# Patient Record
Sex: FEMALE | Race: WHITE | NOT HISPANIC OR LATINO | URBAN - METROPOLITAN AREA
[De-identification: names, ages, dates, MRNs, and addresses within clinical notes are randomized per-mention and may not be internally consistent; named-entity substitution may affect disease eponyms.]

---

## 2020-06-12 ENCOUNTER — OFFICE VISIT (OUTPATIENT)
Dept: URBAN - METROPOLITAN AREA CLINIC 97 | Facility: CLINIC | Age: 25
End: 2020-06-12
Payer: COMMERCIAL

## 2020-06-12 VITALS
HEIGHT: 65 IN | BODY MASS INDEX: 20.66 KG/M2 | WEIGHT: 124 LBS | DIASTOLIC BLOOD PRESSURE: 69 MMHG | RESPIRATION RATE: 16 BRPM | TEMPERATURE: 96.1 F | SYSTOLIC BLOOD PRESSURE: 109 MMHG

## 2020-06-12 DIAGNOSIS — K51.80 CHRONIC PANCOLONIC ULCERATIVE COLITIS: ICD-10-CM

## 2020-06-12 PROCEDURE — 96375 TX/PRO/DX INJ NEW DRUG ADDON: CPT | Performed by: INTERNAL MEDICINE

## 2020-06-12 PROCEDURE — 96413 CHEMO IV INFUSION 1 HR: CPT | Performed by: INTERNAL MEDICINE

## 2020-06-12 PROCEDURE — 96415 CHEMO IV INFUSION ADDL HR: CPT | Performed by: INTERNAL MEDICINE

## 2020-06-12 RX ORDER — INFLIXIMAB 100 MG/10ML
INFUSE 5 MG/KG OVER NO LESS THAN 2 HOUR(S) BY INTRAVENOUS ROUTE INJECTION, POWDER, LYOPHILIZED, FOR SOLUTION INTRAVENOUS
Qty: 1 | Refills: 0 | Status: ACTIVE | COMMUNITY
Start: 1900-01-01 | End: 1900-01-01

## 2020-06-12 RX ORDER — SOY PROTEIN
POWDER (GRAM) ORAL
Qty: 0 | Refills: 0 | Status: ACTIVE | COMMUNITY
Start: 1900-01-01 | End: 1900-01-01

## 2020-06-12 RX ORDER — SACCHAROMYCES BOULARDII 250 MG
CAPSULE ORAL
Qty: 0 | Refills: 0 | Status: ACTIVE | COMMUNITY
Start: 1900-01-01 | End: 1900-01-01

## 2020-06-12 RX ORDER — MESALAMINE 1000 MG/1
INSERT 1 SUPPOSITORY BY RECTAL ROUTE DAILY FOR 30 DAYS SUPPOSITORY RECTAL 1
Qty: 30 | Refills: 2 | Status: ACTIVE | COMMUNITY
Start: 2020-04-08 | End: 2020-07-07

## 2020-07-28 ENCOUNTER — TELEPHONE ENCOUNTER (OUTPATIENT)
Dept: URBAN - METROPOLITAN AREA CLINIC 92 | Facility: CLINIC | Age: 25
End: 2020-07-28

## 2020-07-30 ENCOUNTER — TELEPHONE ENCOUNTER (OUTPATIENT)
Dept: URBAN - METROPOLITAN AREA CLINIC 78 | Facility: CLINIC | Age: 25
End: 2020-07-30

## 2020-07-31 ENCOUNTER — OFFICE VISIT (OUTPATIENT)
Dept: URBAN - METROPOLITAN AREA SURGERY CENTER 15 | Facility: SURGERY CENTER | Age: 25
End: 2020-07-31

## 2020-07-31 ENCOUNTER — TELEPHONE ENCOUNTER (OUTPATIENT)
Dept: URBAN - METROPOLITAN AREA CLINIC 78 | Facility: CLINIC | Age: 25
End: 2020-07-31

## 2020-08-04 ENCOUNTER — OFFICE VISIT (OUTPATIENT)
Dept: URBAN - METROPOLITAN AREA CLINIC 77 | Facility: CLINIC | Age: 25
End: 2020-08-04

## 2020-08-07 ENCOUNTER — OFFICE VISIT (OUTPATIENT)
Dept: URBAN - METROPOLITAN AREA CLINIC 97 | Facility: CLINIC | Age: 25
End: 2020-08-07

## 2020-08-07 RX ORDER — SACCHAROMYCES BOULARDII 250 MG
CAPSULE ORAL
Qty: 0 | Refills: 0 | Status: ACTIVE | COMMUNITY
Start: 1900-01-01 | End: 1900-01-01

## 2020-08-07 RX ORDER — SOY PROTEIN
POWDER (GRAM) ORAL
Qty: 0 | Refills: 0 | Status: ACTIVE | COMMUNITY
Start: 1900-01-01 | End: 1900-01-01

## 2020-08-07 RX ORDER — INFLIXIMAB 100 MG/10ML
INFUSE 5 MG/KG OVER NO LESS THAN 2 HOUR(S) BY INTRAVENOUS ROUTE INJECTION, POWDER, LYOPHILIZED, FOR SOLUTION INTRAVENOUS
Qty: 1 | Refills: 0 | Status: ACTIVE | COMMUNITY
Start: 1900-01-01 | End: 1900-01-01

## 2020-09-18 ENCOUNTER — TELEPHONE ENCOUNTER (OUTPATIENT)
Dept: URBAN - METROPOLITAN AREA CLINIC 6 | Facility: CLINIC | Age: 25
End: 2020-09-18

## 2020-09-18 RX ORDER — INFLIXIMAB 100 MG/10ML
INFUSE 5 MG/KG OVER NO LESS THAN 2 HOUR(S) BY INTRAVENOUS ROUTE INJECTION, POWDER, LYOPHILIZED, FOR SOLUTION INTRAVENOUS
Qty: 10 | Refills: 3
Start: 1900-01-01 | End: 1900-12-27

## 2020-09-21 ENCOUNTER — OFFICE VISIT (OUTPATIENT)
Dept: URBAN - METROPOLITAN AREA CLINIC 97 | Facility: CLINIC | Age: 25
End: 2020-09-21
Payer: COMMERCIAL

## 2020-09-21 VITALS
WEIGHT: 128 LBS | HEIGHT: 65 IN | SYSTOLIC BLOOD PRESSURE: 113 MMHG | HEART RATE: 68 BPM | RESPIRATION RATE: 18 BRPM | DIASTOLIC BLOOD PRESSURE: 83 MMHG | TEMPERATURE: 97.8 F | BODY MASS INDEX: 21.33 KG/M2

## 2020-09-21 DIAGNOSIS — K51.80 CHRONIC PANCOLONIC ULCERATIVE COLITIS: ICD-10-CM

## 2020-09-21 PROCEDURE — 96413 CHEMO IV INFUSION 1 HR: CPT | Performed by: INTERNAL MEDICINE

## 2020-09-21 PROCEDURE — 96415 CHEMO IV INFUSION ADDL HR: CPT | Performed by: INTERNAL MEDICINE

## 2020-09-21 RX ORDER — SACCHAROMYCES BOULARDII 250 MG
CAPSULE ORAL
Qty: 0 | Refills: 0 | Status: ACTIVE | COMMUNITY
Start: 1900-01-01 | End: 1900-01-01

## 2020-09-21 RX ORDER — SOY PROTEIN
POWDER (GRAM) ORAL
Qty: 0 | Refills: 0 | Status: ACTIVE | COMMUNITY
Start: 1900-01-01 | End: 1900-01-01

## 2020-09-21 RX ORDER — INFLIXIMAB 100 MG/10ML
INFUSE 5 MG/KG OVER NO LESS THAN 2 HOUR(S) BY INTRAVENOUS ROUTE INJECTION, POWDER, LYOPHILIZED, FOR SOLUTION INTRAVENOUS
Qty: 1 | Refills: 0 | Status: ACTIVE | COMMUNITY
Start: 1900-01-01 | End: 1900-01-01

## 2020-10-09 ENCOUNTER — OFFICE VISIT (OUTPATIENT)
Dept: URBAN - METROPOLITAN AREA SURGERY CENTER 15 | Facility: SURGERY CENTER | Age: 25
End: 2020-10-09
Payer: COMMERCIAL

## 2020-10-09 ENCOUNTER — CLAIMS CREATED FROM THE CLAIM WINDOW (OUTPATIENT)
Dept: URBAN - METROPOLITAN AREA CLINIC 4 | Facility: CLINIC | Age: 25
End: 2020-10-09
Payer: COMMERCIAL

## 2020-10-09 DIAGNOSIS — K51.20 CHRONIC ULCERATIVE PROCTITIS: ICD-10-CM

## 2020-10-09 DIAGNOSIS — R10.13 ABDOMINAL DISCOMFORT, EPIGASTRIC: ICD-10-CM

## 2020-10-09 DIAGNOSIS — K31.89 OTHER DISEASES OF STOMACH AND DUODENUM: ICD-10-CM

## 2020-10-09 DIAGNOSIS — K51.00 CHRONIC PANCOLONIC ULCERATIVE COLITIS: ICD-10-CM

## 2020-10-09 DIAGNOSIS — K51.80 OTHER ULCERATIVE COLITIS WITHOUT COMPLICATIONS: ICD-10-CM

## 2020-10-09 DIAGNOSIS — K63.89 OTHER SPECIFIED DISEASES OF INTESTINE: ICD-10-CM

## 2020-10-09 PROCEDURE — 45380 COLONOSCOPY AND BIOPSY: CPT | Performed by: INTERNAL MEDICINE

## 2020-10-09 PROCEDURE — 88305 TISSUE EXAM BY PATHOLOGIST: CPT | Performed by: PATHOLOGY

## 2020-10-09 PROCEDURE — G9937 DIG OR SURV COLSCO: HCPCS | Performed by: INTERNAL MEDICINE

## 2020-10-09 PROCEDURE — 88313 SPECIAL STAINS GROUP 2: CPT | Performed by: PATHOLOGY

## 2020-10-09 PROCEDURE — G8907 PT DOC NO EVENTS ON DISCHARG: HCPCS | Performed by: INTERNAL MEDICINE

## 2020-10-09 PROCEDURE — 43239 EGD BIOPSY SINGLE/MULTIPLE: CPT | Performed by: INTERNAL MEDICINE

## 2020-10-09 PROCEDURE — 88312 SPECIAL STAINS GROUP 1: CPT | Performed by: PATHOLOGY

## 2020-10-09 PROCEDURE — 88342 IMHCHEM/IMCYTCHM 1ST ANTB: CPT | Performed by: PATHOLOGY

## 2020-10-09 RX ORDER — INFLIXIMAB 100 MG/10ML
INFUSE 5 MG/KG OVER NO LESS THAN 2 HOUR(S) BY INTRAVENOUS ROUTE INJECTION, POWDER, LYOPHILIZED, FOR SOLUTION INTRAVENOUS
Qty: 1 | Refills: 0 | Status: ACTIVE | COMMUNITY
Start: 1900-01-01

## 2020-10-09 RX ORDER — SACCHAROMYCES BOULARDII 250 MG
CAPSULE ORAL
Qty: 0 | Refills: 0 | Status: ACTIVE | COMMUNITY
Start: 1900-01-01

## 2020-10-09 RX ORDER — SOY PROTEIN
POWDER (GRAM) ORAL
Qty: 0 | Refills: 0 | Status: ACTIVE | COMMUNITY
Start: 1900-01-01

## 2020-11-23 ENCOUNTER — OFFICE VISIT (OUTPATIENT)
Dept: URBAN - METROPOLITAN AREA CLINIC 97 | Facility: CLINIC | Age: 25
End: 2020-11-23

## 2020-11-23 ENCOUNTER — OFFICE VISIT (OUTPATIENT)
Dept: URBAN - METROPOLITAN AREA CLINIC 97 | Facility: CLINIC | Age: 25
End: 2020-11-23
Payer: COMMERCIAL

## 2020-11-23 VITALS
HEART RATE: 68 BPM | RESPIRATION RATE: 17 BRPM | WEIGHT: 126 LBS | TEMPERATURE: 97.2 F | HEIGHT: 65 IN | SYSTOLIC BLOOD PRESSURE: 113 MMHG | BODY MASS INDEX: 20.99 KG/M2 | DIASTOLIC BLOOD PRESSURE: 87 MMHG

## 2020-11-23 DIAGNOSIS — K51.80 CHRONIC PANCOLONIC ULCERATIVE COLITIS: ICD-10-CM

## 2020-11-23 PROCEDURE — 96413 CHEMO IV INFUSION 1 HR: CPT | Performed by: INTERNAL MEDICINE

## 2020-11-23 PROCEDURE — 96415 CHEMO IV INFUSION ADDL HR: CPT | Performed by: INTERNAL MEDICINE

## 2020-11-23 RX ORDER — SACCHAROMYCES BOULARDII 250 MG
CAPSULE ORAL
Qty: 0 | Refills: 0 | Status: ACTIVE | COMMUNITY
Start: 1900-01-01 | End: 1900-01-01

## 2020-11-23 RX ORDER — SOY PROTEIN
POWDER (GRAM) ORAL
Qty: 0 | Refills: 0 | Status: ACTIVE | COMMUNITY
Start: 1900-01-01 | End: 1900-01-01

## 2020-11-23 RX ORDER — INFLIXIMAB 100 MG/10ML
INFUSE 5 MG/KG OVER NO LESS THAN 2 HOUR(S) BY INTRAVENOUS ROUTE INJECTION, POWDER, LYOPHILIZED, FOR SOLUTION INTRAVENOUS
Qty: 1 | Refills: 0 | Status: ACTIVE | COMMUNITY
Start: 1900-01-01 | End: 1900-01-01

## 2021-01-15 ENCOUNTER — OFFICE VISIT (OUTPATIENT)
Dept: URBAN - METROPOLITAN AREA CLINIC 97 | Facility: CLINIC | Age: 26
End: 2021-01-15
Payer: COMMERCIAL

## 2021-01-15 DIAGNOSIS — K51.80 CHRONIC PANCOLONIC ULCERATIVE COLITIS: ICD-10-CM

## 2021-01-15 PROCEDURE — 96415 CHEMO IV INFUSION ADDL HR: CPT | Performed by: INTERNAL MEDICINE

## 2021-01-15 PROCEDURE — 96375 TX/PRO/DX INJ NEW DRUG ADDON: CPT | Performed by: INTERNAL MEDICINE

## 2021-01-15 PROCEDURE — 96413 CHEMO IV INFUSION 1 HR: CPT | Performed by: INTERNAL MEDICINE

## 2021-01-15 RX ORDER — SACCHAROMYCES BOULARDII 250 MG
CAPSULE ORAL
Qty: 0 | Refills: 0 | Status: ACTIVE | COMMUNITY
Start: 1900-01-01 | End: 1900-01-01

## 2021-01-15 RX ORDER — INFLIXIMAB 100 MG/10ML
INFUSE 5 MG/KG OVER NO LESS THAN 2 HOUR(S) BY INTRAVENOUS ROUTE INJECTION, POWDER, LYOPHILIZED, FOR SOLUTION INTRAVENOUS
Qty: 1 | Refills: 0 | Status: ACTIVE | COMMUNITY
Start: 1900-01-01 | End: 1900-01-01

## 2021-01-15 RX ORDER — SOY PROTEIN
POWDER (GRAM) ORAL
Qty: 0 | Refills: 0 | Status: ACTIVE | COMMUNITY
Start: 1900-01-01 | End: 1900-01-01

## 2021-01-28 ENCOUNTER — TELEPHONE ENCOUNTER (OUTPATIENT)
Dept: URBAN - METROPOLITAN AREA CLINIC 78 | Facility: CLINIC | Age: 26
End: 2021-01-28

## 2021-03-10 ENCOUNTER — TELEPHONE ENCOUNTER (OUTPATIENT)
Dept: URBAN - METROPOLITAN AREA CLINIC 78 | Facility: CLINIC | Age: 26
End: 2021-03-10

## 2021-03-12 ENCOUNTER — OFFICE VISIT (OUTPATIENT)
Dept: URBAN - METROPOLITAN AREA CLINIC 97 | Facility: CLINIC | Age: 26
End: 2021-03-12

## 2021-04-07 ENCOUNTER — TELEPHONE ENCOUNTER (OUTPATIENT)
Dept: URBAN - METROPOLITAN AREA CLINIC 78 | Facility: CLINIC | Age: 26
End: 2021-04-07

## 2021-04-16 ENCOUNTER — TELEPHONE ENCOUNTER (OUTPATIENT)
Dept: URBAN - METROPOLITAN AREA CLINIC 77 | Facility: CLINIC | Age: 26
End: 2021-04-16

## 2021-04-20 ENCOUNTER — TELEPHONE ENCOUNTER (OUTPATIENT)
Dept: URBAN - METROPOLITAN AREA CLINIC 78 | Facility: CLINIC | Age: 26
End: 2021-04-20

## 2021-04-30 ENCOUNTER — OFFICE VISIT (OUTPATIENT)
Dept: URBAN - METROPOLITAN AREA CLINIC 78 | Facility: CLINIC | Age: 26
End: 2021-04-30
Payer: COMMERCIAL

## 2021-04-30 VITALS
RESPIRATION RATE: 16 BRPM | TEMPERATURE: 97.4 F | SYSTOLIC BLOOD PRESSURE: 105 MMHG | HEIGHT: 65 IN | HEART RATE: 91 BPM | WEIGHT: 113 LBS | BODY MASS INDEX: 18.83 KG/M2 | DIASTOLIC BLOOD PRESSURE: 74 MMHG

## 2021-04-30 DIAGNOSIS — E53.8 VITAMIN B12 DEFICIENCY: ICD-10-CM

## 2021-04-30 DIAGNOSIS — E55.9 VITAMIN D DEFICIENCY: ICD-10-CM

## 2021-04-30 DIAGNOSIS — K51.80 CHRONIC PANCOLONIC ULCERATIVE COLITIS: ICD-10-CM

## 2021-04-30 PROCEDURE — 96372 THER/PROPH/DIAG INJ SC/IM: CPT | Performed by: INTERNAL MEDICINE

## 2021-04-30 PROCEDURE — 99214 OFFICE O/P EST MOD 30 MIN: CPT | Performed by: INTERNAL MEDICINE

## 2021-04-30 RX ORDER — MESALAMINE 1.2 G/1
2 TABLETS TABLET, DELAYED RELEASE ORAL ONCE A DAY
Qty: 90 | Refills: 0 | OUTPATIENT
Start: 2021-04-30 | End: 2021-07-29

## 2021-04-30 RX ORDER — INFLIXIMAB 100 MG/10ML
INFUSE 5 MG/KG OVER NO LESS THAN 2 HOUR(S) BY INTRAVENOUS ROUTE INJECTION, POWDER, LYOPHILIZED, FOR SOLUTION INTRAVENOUS
Qty: 1 | Refills: 0 | Status: ON HOLD | COMMUNITY
Start: 1900-01-01

## 2021-04-30 RX ORDER — SACCHAROMYCES BOULARDII 250 MG
CAPSULE ORAL
Qty: 0 | Refills: 0 | Status: ON HOLD | COMMUNITY
Start: 1900-01-01

## 2021-04-30 RX ORDER — SOY PROTEIN
POWDER (GRAM) ORAL
Qty: 0 | Refills: 0 | Status: ON HOLD | COMMUNITY
Start: 1900-01-01

## 2021-04-30 NOTE — HPI-TODAY'S VISIT:
Last Remicade dose was Magen 15   ( Patient was initially on q 6 weeks and then extended back to q 8 weeks  after hecking her Remicade serology  and response ) She has not been able to get her infusions because of insurance company wanting to switch her over to Inflectra and appeals process failing  She has been doing well   [facility] : BM q 2 days No rectal bleeding  No pain  No urgency or tenesmus  Last labs were with PCP in Dec 2020  Vit D  59  Vit B 12 299   CBC Hg 14.0 Hct 42.9 Platelet 311 CMP Normal   Last colonoscopy 10/20 : Patchy inactive colitis in left side of colon and rectum ( random bx from right and miiddle part of colon reveal histological remission ) Hx of shingles post surgery ( Rhinoplasty )  , resolved  Has mild Raynauds Feels pretty good , feels energized  Diet is very healthy now   Has d/c Fluoxetine  Has d/c meds for ADHD  Patient saw Blu PATEL on Dec 20 2019  She has a dx with ADHD and anxiety Denies Etoh use Denies excessive exercise Last Derm exam: sees one regularly .... Last PAP smear , never had one ( Not sexually active ) .. Not on OCP Not in a relationship Summarizing : Dx with pancolitis in 2010 (pt known to me )and then she transferred to [place] 2015 and have relocating back .

## 2021-05-19 ENCOUNTER — TELEPHONE ENCOUNTER (OUTPATIENT)
Dept: URBAN - METROPOLITAN AREA CLINIC 78 | Facility: CLINIC | Age: 26
End: 2021-05-19

## 2021-05-19 RX ORDER — MESALAMINE 1.2 G/1
2 TABLETS TABLET, DELAYED RELEASE ORAL ONCE A DAY
Qty: 180 TABLET | Refills: 0 | OUTPATIENT
Start: 2021-05-19 | End: 2021-08-17

## 2021-05-19 RX ORDER — MESALAMINE 1.2 G/1
2 TABLETS TABLET, DELAYED RELEASE ORAL ONCE A DAY
Qty: 60 | OUTPATIENT
Start: 2021-05-19 | End: 2021-06-18

## 2021-05-28 ENCOUNTER — OFFICE VISIT (OUTPATIENT)
Dept: URBAN - METROPOLITAN AREA CLINIC 97 | Facility: CLINIC | Age: 26
End: 2021-05-28
Payer: COMMERCIAL

## 2021-05-28 DIAGNOSIS — K51.80 CHRONIC PANCOLONIC ULCERATIVE COLITIS: ICD-10-CM

## 2021-05-28 PROCEDURE — 96365 THER/PROPH/DIAG IV INF INIT: CPT | Performed by: INTERNAL MEDICINE

## 2021-05-28 RX ORDER — SOY PROTEIN
POWDER (GRAM) ORAL
Qty: 0 | Refills: 0 | Status: ON HOLD | COMMUNITY
Start: 1900-01-01

## 2021-05-28 RX ORDER — MESALAMINE 1.2 G/1
2 TABLETS TABLET, DELAYED RELEASE ORAL ONCE A DAY
Qty: 90 | Refills: 0 | Status: ACTIVE | COMMUNITY
Start: 2021-04-30 | End: 2021-07-29

## 2021-05-28 RX ORDER — INFLIXIMAB 100 MG/10ML
INFUSE 5 MG/KG OVER NO LESS THAN 2 HOUR(S) BY INTRAVENOUS ROUTE INJECTION, POWDER, LYOPHILIZED, FOR SOLUTION INTRAVENOUS
Qty: 1 | Refills: 0 | Status: ON HOLD | COMMUNITY
Start: 1900-01-01

## 2021-05-28 RX ORDER — MESALAMINE 1.2 G/1
2 TABLETS TABLET, DELAYED RELEASE ORAL ONCE A DAY
Qty: 180 TABLET | Refills: 0 | Status: ACTIVE | COMMUNITY
Start: 2021-05-19 | End: 2021-08-17

## 2021-05-28 RX ORDER — SACCHAROMYCES BOULARDII 250 MG
CAPSULE ORAL
Qty: 0 | Refills: 0 | Status: ON HOLD | COMMUNITY
Start: 1900-01-01

## 2021-06-01 ENCOUNTER — TELEPHONE ENCOUNTER (OUTPATIENT)
Dept: URBAN - METROPOLITAN AREA CLINIC 78 | Facility: CLINIC | Age: 26
End: 2021-06-01

## 2021-06-09 ENCOUNTER — OFFICE VISIT (OUTPATIENT)
Dept: URBAN - METROPOLITAN AREA CLINIC 78 | Facility: CLINIC | Age: 26
End: 2021-06-09
Payer: COMMERCIAL

## 2021-06-09 VITALS
BODY MASS INDEX: 18.59 KG/M2 | WEIGHT: 111.6 LBS | HEART RATE: 61 BPM | DIASTOLIC BLOOD PRESSURE: 73 MMHG | RESPIRATION RATE: 16 BRPM | HEIGHT: 65 IN | SYSTOLIC BLOOD PRESSURE: 109 MMHG | TEMPERATURE: 97.7 F

## 2021-06-09 DIAGNOSIS — E53.8 VITAMIN B12 DEFICIENCY: ICD-10-CM

## 2021-06-09 DIAGNOSIS — E55.9 VITAMIN D DEFICIENCY: ICD-10-CM

## 2021-06-09 DIAGNOSIS — K51.00 ULCERATIVE PANCOLITIS WITHOUT COMPLICATION: ICD-10-CM

## 2021-06-09 PROCEDURE — 99213 OFFICE O/P EST LOW 20 MIN: CPT | Performed by: INTERNAL MEDICINE

## 2021-06-09 PROCEDURE — 96372 THER/PROPH/DIAG INJ SC/IM: CPT | Performed by: INTERNAL MEDICINE

## 2021-06-09 RX ORDER — INFLIXIMAB 100 MG/10ML
INFUSE 5 MG/KG OVER NO LESS THAN 2 HOUR(S) BY INTRAVENOUS ROUTE INJECTION, POWDER, LYOPHILIZED, FOR SOLUTION INTRAVENOUS
Qty: 1 | Refills: 0 | Status: ON HOLD | COMMUNITY
Start: 1900-01-01

## 2021-06-09 RX ORDER — SACCHAROMYCES BOULARDII 250 MG
CAPSULE ORAL
Qty: 0 | Refills: 0 | Status: ON HOLD | COMMUNITY
Start: 1900-01-01

## 2021-06-09 RX ORDER — MESALAMINE 1.2 G/1
2 TABLETS TABLET, DELAYED RELEASE ORAL ONCE A DAY
Qty: 90 | Refills: 0 | Status: ACTIVE | COMMUNITY
Start: 2021-04-30 | End: 2021-07-29

## 2021-06-09 RX ORDER — MESALAMINE 1.2 G/1
2 TABLETS TABLET, DELAYED RELEASE ORAL ONCE A DAY
Qty: 180 TABLET | Refills: 0 | Status: ACTIVE | COMMUNITY
Start: 2021-05-19 | End: 2021-08-17

## 2021-06-09 RX ORDER — SOY PROTEIN
POWDER (GRAM) ORAL
Qty: 0 | Refills: 0 | Status: ON HOLD | COMMUNITY
Start: 1900-01-01

## 2021-06-09 NOTE — HPI-TODAY'S VISIT:
Patient reports getting Stelara infusion  She tolerated Stelara  infusion well  Patient states that she is fine except  cold sore and drop in sexual drive  She has seen other people report on patient support group  This particular side effect : low libido despite being in clinical remission is not acceptible  She is not on Fluxetine ( she discontinued after 3 days  it because it did not help )  She states that Concerta did not effect he libido in past  She also states that Remicade did not effect it either   Overall she feels well and denies anxiety or depression   Patient states that right after Stelara  she did have hemorrhoid which had since resolved .  [facility] : BM q 1 day No rectal bleeding  No pain  No urgency or tenesmus  She exercises regularly an diet is ok  Weight is ok  Last labs were with PCP in Dec 2020  Vit D  59  Vit B 12 :299    CBC Hg 14.0 Hct 42.9 Platelet 311 CMP Normal   Last colonoscopy 10/20 : Patchy inactive colitis in left side of colon and rectum ( random bx from right and miiddle part of colon reveal histological remission ) Hx of shingles post surgery ( Rhinoplasty )  , resolved  Has mild Raynau  Has d/c Fluoxetine  Has d/c meds for ADHD  Patient saw Blu Holguin CRS on Dec 20 2019  She has a dx with ADHD and anxiety Denies Etoh use Last Derm exam: sees one regularly .... Last PAP smear last year  ( Zofia Ramirez ) : She is sexually active and admits to using precautions . Currently nt on OCP Summarizing : Dx with pancolitis in 2010 (pt known to me )and then she transferred to [place] 2015 and have relocating back .

## 2021-06-18 ENCOUNTER — TELEPHONE ENCOUNTER (OUTPATIENT)
Dept: URBAN - METROPOLITAN AREA CLINIC 78 | Facility: CLINIC | Age: 26
End: 2021-06-18

## 2021-06-23 ENCOUNTER — TELEPHONE ENCOUNTER (OUTPATIENT)
Dept: URBAN - METROPOLITAN AREA CLINIC 78 | Facility: CLINIC | Age: 26
End: 2021-06-23

## 2021-06-23 RX ORDER — USTEKINUMAB 90 MG/ML: AS DIRECTED INJECTION, SOLUTION SUBCUTANEOUS

## 2021-07-01 ENCOUNTER — TELEPHONE ENCOUNTER (OUTPATIENT)
Dept: URBAN - METROPOLITAN AREA CLINIC 78 | Facility: CLINIC | Age: 26
End: 2021-07-01

## 2021-07-06 ENCOUNTER — TELEPHONE ENCOUNTER (OUTPATIENT)
Dept: URBAN - METROPOLITAN AREA CLINIC 78 | Facility: CLINIC | Age: 26
End: 2021-07-06

## 2021-07-22 ENCOUNTER — OFFICE VISIT (OUTPATIENT)
Dept: URBAN - METROPOLITAN AREA CLINIC 78 | Facility: CLINIC | Age: 26
End: 2021-07-22

## 2021-08-10 ENCOUNTER — TELEPHONE ENCOUNTER (OUTPATIENT)
Dept: URBAN - METROPOLITAN AREA CLINIC 78 | Facility: CLINIC | Age: 26
End: 2021-08-10

## 2021-08-11 ENCOUNTER — OFFICE VISIT (OUTPATIENT)
Dept: URBAN - METROPOLITAN AREA CLINIC 78 | Facility: CLINIC | Age: 26
End: 2021-08-11
Payer: COMMERCIAL

## 2021-08-11 DIAGNOSIS — K51.00 ULCERATIVE PANCOLITIS WITHOUT COMPLICATION: ICD-10-CM

## 2021-08-11 DIAGNOSIS — R10.32 ABDOMINAL PAIN, ACUTE, LEFT LOWER QUADRANT: ICD-10-CM

## 2021-08-11 PROCEDURE — 99213 OFFICE O/P EST LOW 20 MIN: CPT | Performed by: INTERNAL MEDICINE

## 2021-08-11 RX ORDER — SACCHAROMYCES BOULARDII 250 MG
CAPSULE ORAL
Qty: 0 | Refills: 0 | Status: ON HOLD | COMMUNITY
Start: 1900-01-01

## 2021-08-11 RX ORDER — USTEKINUMAB 90 MG/ML
AS DIRECTED INJECTION, SOLUTION SUBCUTANEOUS
OUTPATIENT

## 2021-08-11 RX ORDER — USTEKINUMAB 90 MG/ML
AS DIRECTED INJECTION, SOLUTION SUBCUTANEOUS
Status: ON HOLD | COMMUNITY

## 2021-08-11 RX ORDER — INFLIXIMAB 100 MG/10ML
INFUSE 5 MG/KG OVER NO LESS THAN 2 HOUR(S) BY INTRAVENOUS ROUTE INJECTION, POWDER, LYOPHILIZED, FOR SOLUTION INTRAVENOUS
Qty: 1 | Refills: 0 | Status: ACTIVE | COMMUNITY
Start: 1900-01-01

## 2021-08-11 RX ORDER — INFLIXIMAB 100 MG/10ML
INFUSE 5 MG/KG OVER NO LESS THAN 2 HOUR(S) BY INTRAVENOUS ROUTE INJECTION, POWDER, LYOPHILIZED, FOR SOLUTION INTRAVENOUS
Qty: 1 | Refills: 0

## 2021-08-11 RX ORDER — MESALAMINE 1.2 G/1
2 TABLETS TABLET, DELAYED RELEASE ORAL ONCE A DAY
Qty: 180 TABLET | Refills: 0 | OUTPATIENT
Start: 2021-08-11 | End: 2021-11-08

## 2021-08-11 RX ORDER — MESALAMINE 1.2 G/1
2 TABLETS TABLET, DELAYED RELEASE ORAL ONCE A DAY
Qty: 180 TABLET | Refills: 0 | Status: ACTIVE | COMMUNITY
Start: 2021-05-19

## 2021-08-11 RX ORDER — SOY PROTEIN
POWDER (GRAM) ORAL
Qty: 0 | Refills: 0 | Status: ON HOLD | COMMUNITY
Start: 1900-01-01

## 2021-08-11 NOTE — HPI-TODAY'S VISIT:
Patient has a terrible experience from Stelara   She reports extreme fatigue  She reports day time caffiene intake has increased  She reports backpain , b/l hip pain / knee pain ( left more than right )  and stiffness She went to Dermatologist ( Dr Ana María Mclaughlin ) and was dx allergic reaction for medication  She saw Gynae to r/o Gynaecologist  ( Dr Guillermina Ramirez ) cause  She also tested neg for CoVID 19  She  had Pneumonia type symptoms and saw Urgent care ,She was given Albuterol /steroid inhalers  She is fully vaccinated   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   [facility] : BM q 3-4 /day , urgency  No rectal bleeding    Patient reports pain in left side   Patient has Lialda at home   Reicade in Jan and last in May ( even while )  Patient reports getting Stelara infusion  She tolerated Stelara  infusion well  Patient states that she is fine except  cold sore and drop in sexual drive  She has seen other people report on patient support group  This particular side effect : low libido despite being in clinical remission is not acceptible  She is not on Fluxetine ( she discontinued after 3 days  it because it did not help )  She states that Concerta did not effect he libido in past  She also states that Remicade did not effect it either   Overall she feels well and denies anxiety or depression   Patient states that right after Stelara  she did have hemorrhoid which had since resolved .  [facility] : BM q 1 day No rectal bleeding  No pain  No urgency or tenesmus  She exercises regularly an diet is ok  Weight is ok  Last labs were with PCP in Dec 2020  Vit D  59  Vit B 12 :299    CBC Hg 14.0 Hct 42.9 Platelet 311 CMP Normal   Last colonoscopy 10/20 : Patchy inactive colitis in left side of colon and rectum ( random bx from right and miiddle part of colon reveal histological remission ) Hx of shingles post surgery ( Rhinoplasty )  , resolved  Has mild Raynauds  Has d/c Fluoxetine  Has d/c meds for ADHD  Patient saw Blu Holguin CRS on Dec 20 2019  She has a dx with ADHD and anxiety Denies Etoh use Last Derm exam: sees one regularly .... Last PAP smear last year  ( Zofia Ramirez ) : She is sexually active and admits to using precautions . Currently nt on OCP Summarizing : Dx with pancolitis in 2010 (pt known to me )and then she transferred to [place] 2015 and have relocating back .

## 2021-08-11 NOTE — PHYSICAL EXAM GASTROINTESTINAL
Abdomen , soft, nontender, nondistended , no guarding or rigidity , no masses palpable , normal bowel sounds , Liver and Spleen , no hepatomegaly present , liver nontender , spleen not palpable
DISPLAY PLAN FREE TEXT

## 2021-08-15 ENCOUNTER — TELEPHONE ENCOUNTER (OUTPATIENT)
Dept: URBAN - METROPOLITAN AREA CLINIC 78 | Facility: CLINIC | Age: 26
End: 2021-08-15

## 2021-08-18 ENCOUNTER — OFFICE VISIT (OUTPATIENT)
Dept: URBAN - METROPOLITAN AREA CLINIC 78 | Facility: CLINIC | Age: 26
End: 2021-08-18

## 2021-09-10 ENCOUNTER — OFFICE VISIT (OUTPATIENT)
Dept: URBAN - METROPOLITAN AREA CLINIC 78 | Facility: CLINIC | Age: 26
End: 2021-09-10
Payer: COMMERCIAL

## 2021-09-10 ENCOUNTER — OFFICE VISIT (OUTPATIENT)
Dept: URBAN - METROPOLITAN AREA CLINIC 77 | Facility: CLINIC | Age: 26
End: 2021-09-10
Payer: COMMERCIAL

## 2021-09-10 VITALS
DIASTOLIC BLOOD PRESSURE: 71 MMHG | WEIGHT: 111 LBS | TEMPERATURE: 97.8 F | HEART RATE: 70 BPM | SYSTOLIC BLOOD PRESSURE: 104 MMHG | BODY MASS INDEX: 18.49 KG/M2 | RESPIRATION RATE: 16 BRPM | HEIGHT: 65 IN

## 2021-09-10 DIAGNOSIS — K51.00 ULCERATIVE PANCOLITIS WITHOUT COMPLICATION: ICD-10-CM

## 2021-09-10 DIAGNOSIS — E53.8 B12 DEFICIENCY: ICD-10-CM

## 2021-09-10 DIAGNOSIS — M25.561 ARTHRALGIA OF RIGHT KNEE: ICD-10-CM

## 2021-09-10 DIAGNOSIS — M19.90 ARTHRITIS: ICD-10-CM

## 2021-09-10 PROBLEM — 3723001: Status: ACTIVE | Noted: 2021-09-10

## 2021-09-10 PROBLEM — 64766004 ULCERATIVE COLITIS: Status: ACTIVE | Noted: 2021-04-30

## 2021-09-10 PROCEDURE — 99213 OFFICE O/P EST LOW 20 MIN: CPT | Performed by: INTERNAL MEDICINE

## 2021-09-10 PROCEDURE — 96372 THER/PROPH/DIAG INJ SC/IM: CPT | Performed by: INTERNAL MEDICINE

## 2021-09-10 RX ORDER — SACCHAROMYCES BOULARDII 250 MG
CAPSULE ORAL
Qty: 0 | Refills: 0 | Status: ON HOLD | COMMUNITY
Start: 1900-01-01

## 2021-09-10 RX ORDER — INFLIXIMAB 100 MG/10ML
INFUSE 5 MG/KG OVER NO LESS THAN 2 HOUR(S) BY INTRAVENOUS ROUTE INJECTION, POWDER, LYOPHILIZED, FOR SOLUTION INTRAVENOUS
Qty: 1 | Refills: 0 | Status: ACTIVE | COMMUNITY

## 2021-09-10 RX ORDER — MESALAMINE 1.2 G/1
2 TABLETS TABLET, DELAYED RELEASE ORAL ONCE A DAY
Qty: 180 TABLET | Refills: 0 | Status: ACTIVE | COMMUNITY
Start: 2021-08-11 | End: 2021-11-08

## 2021-09-10 RX ORDER — SOY PROTEIN
POWDER (GRAM) ORAL
Qty: 0 | Refills: 0 | Status: ON HOLD | COMMUNITY
Start: 1900-01-01

## 2021-09-10 RX ORDER — MESALAMINE 1.2 G/1
2 TABLETS TABLET, DELAYED RELEASE ORAL ONCE A DAY
Qty: 180 TABLET | Refills: 0 | OUTPATIENT

## 2021-09-10 RX ORDER — MESALAMINE 1.2 G/1
2 TABLETS TABLET, DELAYED RELEASE ORAL ONCE A DAY
Qty: 180 TABLET | Refills: 0 | Status: ACTIVE | COMMUNITY
Start: 2021-05-19

## 2021-09-10 RX ORDER — INFLIXIMAB 100 MG/10ML
INFUSE 5 MG/KG OVER NO LESS THAN 2 HOUR(S) BY INTRAVENOUS ROUTE INJECTION, POWDER, LYOPHILIZED, FOR SOLUTION INTRAVENOUS
Qty: 1 | Refills: 0

## 2021-09-10 NOTE — HPI-TODAY'S VISIT:
Patient is here she reports joint pains , migratory polyarthritis  She also reports rash and itching  and used Claritin  She is awaits Remicade infusion  approval  Patient did well on Biological infusion Remicade  Did not tolerate Stelara Her colon is ok  She prefers to avoid subcut injections Humira and or Simponi  [facility] : BM q 3-4 /day , urgency  No rectal bleeding   Patient reports pain in left side  She is wearing a knee brace on her right leg  PREVIOUS ENCOUNTER: Patient has a terrible experience from Stelara  She reports extreme fatigue  She reports day time caffiene intake has increased  She reports backpain , b/l hip pain / knee pain ( left more than right )  and stiffness She went to Dermatologist ( Dr Ana María Mclaughlin ) and was dx allergic reaction for medication  She saw Gynae to r/o Gynaecologist  ( Dr Guillermina Ramirez ) cause  She also tested neg for CoVID 19  She  had Pneumonia type symptoms and saw Urgent care ,She was given Albuterol /steroid inhalers  She is fully vaccinated   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   [facility] : BM q 3-4 /day , urgency  No rectal bleeding    Patient reports pain in left side   Patient has Lialda at home   Reicade in Jan and last in May ( even while )  Patient reports getting Stelara infusion  She tolerated Stelara  infusion well  Patient states that she is fine except  cold sore and drop in sexual drive  She has seen other people report on patient support group  This particular side effect : low libido despite being in clinical remission is not acceptible  She is not on Fluxetine ( she discontinued after 3 days  it because it did not help )  She states that Concerta did not effect he libido in past  She also states that Remicade did not effect it either   Overall she feels well and denies anxiety or depression   Patient states that right after Stelara  she did have hemorrhoid which had since resolved .  [facility] : BM q 1 day No rectal bleeding  No pain  No urgency or tenesmus  She exercises regularly an diet is ok  Weight is ok  Last labs were with PCP in Dec 2020  Vit D  59  Vit B 12 :299    CBC Hg 14.0 Hct 42.9 Platelet 311 CMP Normal   Last colonoscopy 10/20 : Patchy inactive colitis in left side of colon and rectum ( random bx from right and miiddle part of colon reveal histological remission ) Hx of shingles post surgery ( Rhinoplasty )  , resolved  Has mild Raynauds  Has d/c Fluoxetine  Has d/c meds for ADHD  Patient saw Blu PATEL on Dec 20 2019  She has a dx with ADHD and anxiety Denies Etoh use Last Derm exam: sees one regularly .... Last PAP smear last year  ( Zofia Ramirez ) : She is sexually active and admits to using precautions . Currently nt on OCP Summarizing : Dx with pancolitis in 2010 (pt known to me )and then she transferred to [place] 2015 and have relocating back .

## 2021-09-10 NOTE — PHYSICAL EXAM CHEST:
breathing is un-labored without accessory muscle use, normal breath sounds Detail Level: Zone Detail Level: Detailed

## 2021-09-17 ENCOUNTER — TELEPHONE ENCOUNTER (OUTPATIENT)
Dept: URBAN - METROPOLITAN AREA CLINIC 78 | Facility: CLINIC | Age: 26
End: 2021-09-17

## 2021-09-29 ENCOUNTER — TELEPHONE ENCOUNTER (OUTPATIENT)
Dept: URBAN - METROPOLITAN AREA CLINIC 78 | Facility: CLINIC | Age: 26
End: 2021-09-29

## 2021-09-29 RX ORDER — INFLIXIMAB 100 MG/10ML
AS DIRECTED INJECTION, POWDER, LYOPHILIZED, FOR SOLUTION INTRAVENOUS
Qty: 100 MILLIGRAMS | Refills: 0 | OUTPATIENT
Start: 2021-09-29 | End: 2021-10-29

## 2021-09-29 RX ORDER — HYDROCORTISONE SODIUM SUCCINATE 100 MG/2ML
AS DIRECTED INJECTION, POWDER, FOR SOLUTION INTRAMUSCULAR; INTRAVENOUS
Qty: 100 MILLIGRAM | Refills: 0 | OUTPATIENT
Start: 2021-09-29 | End: 2021-09-30

## 2021-10-12 ENCOUNTER — TELEPHONE ENCOUNTER (OUTPATIENT)
Dept: URBAN - METROPOLITAN AREA CLINIC 78 | Facility: CLINIC | Age: 26
End: 2021-10-12

## 2021-10-12 RX ORDER — HYDROCORTISONE SODIUM SUCCINATE 100 MG/2ML
AS DIRECTED INJECTION, POWDER, FOR SOLUTION INTRAMUSCULAR; INTRAVENOUS
Qty: 100 MILLIGRAM | Refills: 0 | OUTPATIENT
Start: 2021-10-13 | End: 2021-10-14

## 2021-10-12 RX ORDER — INFLIXIMAB 100 MG/10ML
AS DIRECTED INJECTION, POWDER, LYOPHILIZED, FOR SOLUTION INTRAVENOUS
Qty: 100 MILLIGRAMS | Refills: 0 | OUTPATIENT
Start: 2021-10-13 | End: 2021-11-12

## 2021-10-18 ENCOUNTER — LAB OUTSIDE AN ENCOUNTER (OUTPATIENT)
Dept: URBAN - METROPOLITAN AREA CLINIC 78 | Facility: CLINIC | Age: 26
End: 2021-10-18

## 2021-10-21 LAB
QUANTIFERON CRITERIA: (no result)
QUANTIFERON INCUBATION: (no result)
QUANTIFERON MITOGEN VALUE: 4.51
QUANTIFERON NIL VALUE: 0.01
QUANTIFERON TB1 AG VALUE: 0.04
QUANTIFERON TB2 AG VALUE: 0.02
QUANTIFERON-TB GOLD PLUS: NEGATIVE

## 2021-11-18 ENCOUNTER — TELEPHONE ENCOUNTER (OUTPATIENT)
Dept: URBAN - METROPOLITAN AREA CLINIC 78 | Facility: CLINIC | Age: 26
End: 2021-11-18

## 2021-11-29 ENCOUNTER — OFFICE VISIT (OUTPATIENT)
Dept: URBAN - METROPOLITAN AREA CLINIC 43 | Facility: CLINIC | Age: 26
End: 2021-11-29

## 2021-11-30 ENCOUNTER — OFFICE VISIT (OUTPATIENT)
Dept: URBAN - METROPOLITAN AREA CLINIC 97 | Facility: CLINIC | Age: 26
End: 2021-11-30

## 2021-12-03 ENCOUNTER — OFFICE VISIT (OUTPATIENT)
Dept: URBAN - METROPOLITAN AREA CLINIC 97 | Facility: CLINIC | Age: 26
End: 2021-12-03

## 2021-12-03 RX ORDER — MESALAMINE 1.2 G/1
2 TABLETS TABLET, DELAYED RELEASE ORAL ONCE A DAY
Qty: 180 TABLET | Refills: 0 | Status: ACTIVE | COMMUNITY
Start: 2021-05-19

## 2021-12-03 RX ORDER — INFLIXIMAB 100 MG/10ML
INFUSE 5 MG/KG OVER NO LESS THAN 2 HOUR(S) BY INTRAVENOUS ROUTE INJECTION, POWDER, LYOPHILIZED, FOR SOLUTION INTRAVENOUS
Qty: 1 | Refills: 0 | Status: ACTIVE | COMMUNITY

## 2021-12-03 RX ORDER — SOY PROTEIN
POWDER (GRAM) ORAL
Qty: 0 | Refills: 0 | Status: ON HOLD | COMMUNITY
Start: 1900-01-01

## 2021-12-03 RX ORDER — MESALAMINE 1.2 G/1
2 TABLETS TABLET, DELAYED RELEASE ORAL ONCE A DAY
Qty: 180 TABLET | Refills: 0 | Status: ACTIVE | COMMUNITY

## 2021-12-03 RX ORDER — SACCHAROMYCES BOULARDII 250 MG
CAPSULE ORAL
Qty: 0 | Refills: 0 | Status: ON HOLD | COMMUNITY
Start: 1900-01-01

## 2021-12-17 ENCOUNTER — OFFICE VISIT (OUTPATIENT)
Dept: URBAN - METROPOLITAN AREA CLINIC 97 | Facility: CLINIC | Age: 26
End: 2021-12-17
Payer: COMMERCIAL

## 2021-12-17 ENCOUNTER — TELEPHONE ENCOUNTER (OUTPATIENT)
Dept: URBAN - METROPOLITAN AREA CLINIC 18 | Facility: CLINIC | Age: 26
End: 2021-12-17

## 2021-12-17 DIAGNOSIS — K51.80 CHRONIC PANCOLONIC ULCERATIVE COLITIS: ICD-10-CM

## 2021-12-17 PROCEDURE — 96415 CHEMO IV INFUSION ADDL HR: CPT | Performed by: INTERNAL MEDICINE

## 2021-12-17 PROCEDURE — 96413 CHEMO IV INFUSION 1 HR: CPT | Performed by: INTERNAL MEDICINE

## 2021-12-17 PROCEDURE — 96375 TX/PRO/DX INJ NEW DRUG ADDON: CPT | Performed by: INTERNAL MEDICINE

## 2021-12-17 RX ORDER — SACCHAROMYCES BOULARDII 250 MG
CAPSULE ORAL
Qty: 0 | Refills: 0 | Status: ON HOLD | COMMUNITY
Start: 1900-01-01

## 2021-12-17 RX ORDER — HYDROCORTISONE SODIUM SUCCINATE 100 MG/2ML
AS DIRECTED INJECTION, POWDER, FOR SOLUTION INTRAMUSCULAR; INTRAVENOUS
OUTPATIENT
Start: 2021-12-17

## 2021-12-17 RX ORDER — INFLIXIMAB 100 MG/10ML
INFUSE 5 MG/KG OVER NO LESS THAN 2 HOUR(S) BY INTRAVENOUS ROUTE INJECTION, POWDER, LYOPHILIZED, FOR SOLUTION INTRAVENOUS
Qty: 1 | Refills: 0 | Status: ACTIVE | COMMUNITY

## 2021-12-17 RX ORDER — MESALAMINE 1.2 G/1
2 TABLETS TABLET, DELAYED RELEASE ORAL ONCE A DAY
Qty: 180 TABLET | Refills: 0 | Status: ACTIVE | COMMUNITY
Start: 2021-05-19

## 2021-12-17 RX ORDER — SOY PROTEIN
POWDER (GRAM) ORAL
Qty: 0 | Refills: 0 | Status: ON HOLD | COMMUNITY
Start: 1900-01-01

## 2021-12-17 RX ORDER — MESALAMINE 1.2 G/1
2 TABLETS TABLET, DELAYED RELEASE ORAL ONCE A DAY
Qty: 180 TABLET | Refills: 0 | Status: ACTIVE | COMMUNITY

## 2021-12-21 ENCOUNTER — TELEPHONE ENCOUNTER (OUTPATIENT)
Dept: URBAN - METROPOLITAN AREA CLINIC 78 | Facility: CLINIC | Age: 26
End: 2021-12-21

## 2021-12-22 ENCOUNTER — OFFICE VISIT (OUTPATIENT)
Dept: URBAN - METROPOLITAN AREA CLINIC 77 | Facility: CLINIC | Age: 26
End: 2021-12-22
Payer: COMMERCIAL

## 2021-12-22 DIAGNOSIS — E56.8 DEFICIENCY OF OTHER VITAMINS: ICD-10-CM

## 2021-12-22 PROCEDURE — 96372 THER/PROPH/DIAG INJ SC/IM: CPT | Performed by: INTERNAL MEDICINE

## 2021-12-22 RX ORDER — MESALAMINE 1.2 G/1
2 TABLETS TABLET, DELAYED RELEASE ORAL ONCE A DAY
Qty: 180 TABLET | Refills: 0 | Status: ACTIVE | COMMUNITY
Start: 2021-05-19

## 2021-12-22 RX ORDER — HYDROCORTISONE SODIUM SUCCINATE 100 MG/2ML
AS DIRECTED INJECTION, POWDER, FOR SOLUTION INTRAMUSCULAR; INTRAVENOUS
Status: ACTIVE | COMMUNITY
Start: 2021-12-17

## 2021-12-22 RX ORDER — MESALAMINE 1.2 G/1
2 TABLETS TABLET, DELAYED RELEASE ORAL ONCE A DAY
Qty: 180 TABLET | Refills: 0 | Status: ACTIVE | COMMUNITY

## 2021-12-22 RX ORDER — INFLIXIMAB 100 MG/10ML
INFUSE 5 MG/KG OVER NO LESS THAN 2 HOUR(S) BY INTRAVENOUS ROUTE INJECTION, POWDER, LYOPHILIZED, FOR SOLUTION INTRAVENOUS
Qty: 1 | Refills: 0 | Status: ACTIVE | COMMUNITY

## 2021-12-22 RX ORDER — SOY PROTEIN
POWDER (GRAM) ORAL
Qty: 0 | Refills: 0 | Status: ON HOLD | COMMUNITY
Start: 1900-01-01

## 2021-12-22 RX ORDER — SACCHAROMYCES BOULARDII 250 MG
CAPSULE ORAL
Qty: 0 | Refills: 0 | Status: ON HOLD | COMMUNITY
Start: 1900-01-01

## 2022-01-03 ENCOUNTER — OFFICE VISIT (OUTPATIENT)
Dept: URBAN - METROPOLITAN AREA CLINIC 97 | Facility: CLINIC | Age: 27
End: 2022-01-03

## 2022-01-03 RX ORDER — HYDROCORTISONE SODIUM SUCCINATE 100 MG/2ML
AS DIRECTED INJECTION, POWDER, FOR SOLUTION INTRAMUSCULAR; INTRAVENOUS
Status: ACTIVE | COMMUNITY
Start: 2021-12-17

## 2022-01-03 RX ORDER — SACCHAROMYCES BOULARDII 250 MG
CAPSULE ORAL
Qty: 0 | Refills: 0 | Status: ON HOLD | COMMUNITY
Start: 1900-01-01

## 2022-01-03 RX ORDER — MESALAMINE 1.2 G/1
2 TABLETS TABLET, DELAYED RELEASE ORAL ONCE A DAY
Qty: 180 TABLET | Refills: 0 | Status: ACTIVE | COMMUNITY

## 2022-01-03 RX ORDER — INFLIXIMAB 100 MG/10ML
INFUSE 5 MG/KG OVER NO LESS THAN 2 HOUR(S) BY INTRAVENOUS ROUTE INJECTION, POWDER, LYOPHILIZED, FOR SOLUTION INTRAVENOUS
Qty: 1 | Refills: 0 | Status: ACTIVE | COMMUNITY

## 2022-01-03 RX ORDER — SOY PROTEIN
POWDER (GRAM) ORAL
Qty: 0 | Refills: 0 | Status: ON HOLD | COMMUNITY
Start: 1900-01-01

## 2022-01-03 RX ORDER — MESALAMINE 1.2 G/1
2 TABLETS TABLET, DELAYED RELEASE ORAL ONCE A DAY
Qty: 180 TABLET | Refills: 0 | Status: ACTIVE | COMMUNITY
Start: 2021-05-19

## 2022-01-05 ENCOUNTER — OFFICE VISIT (OUTPATIENT)
Dept: URBAN - METROPOLITAN AREA CLINIC 97 | Facility: CLINIC | Age: 27
End: 2022-01-05
Payer: COMMERCIAL

## 2022-01-05 DIAGNOSIS — K51.80 CHRONIC PANCOLONIC ULCERATIVE COLITIS: ICD-10-CM

## 2022-01-05 PROCEDURE — 96415 CHEMO IV INFUSION ADDL HR: CPT | Performed by: INTERNAL MEDICINE

## 2022-01-05 PROCEDURE — 96375 TX/PRO/DX INJ NEW DRUG ADDON: CPT | Performed by: INTERNAL MEDICINE

## 2022-01-05 PROCEDURE — 96413 CHEMO IV INFUSION 1 HR: CPT | Performed by: INTERNAL MEDICINE

## 2022-01-05 RX ORDER — INFLIXIMAB 100 MG/10ML
INFUSE 5 MG/KG OVER NO LESS THAN 2 HOUR(S) BY INTRAVENOUS ROUTE INJECTION, POWDER, LYOPHILIZED, FOR SOLUTION INTRAVENOUS
Qty: 1 | Refills: 0 | Status: ACTIVE | COMMUNITY

## 2022-01-05 RX ORDER — MESALAMINE 1.2 G/1
2 TABLETS TABLET, DELAYED RELEASE ORAL ONCE A DAY
Qty: 180 TABLET | Refills: 0 | Status: ACTIVE | COMMUNITY
Start: 2021-05-19

## 2022-01-05 RX ORDER — SACCHAROMYCES BOULARDII 250 MG
CAPSULE ORAL
Qty: 0 | Refills: 0 | Status: ON HOLD | COMMUNITY
Start: 1900-01-01

## 2022-01-05 RX ORDER — SOY PROTEIN
POWDER (GRAM) ORAL
Qty: 0 | Refills: 0 | Status: ON HOLD | COMMUNITY
Start: 1900-01-01

## 2022-01-05 RX ORDER — MESALAMINE 1.2 G/1
2 TABLETS TABLET, DELAYED RELEASE ORAL ONCE A DAY
Qty: 180 TABLET | Refills: 0 | Status: ACTIVE | COMMUNITY

## 2022-01-05 RX ORDER — HYDROCORTISONE SODIUM SUCCINATE 100 MG/2ML
AS DIRECTED INJECTION, POWDER, FOR SOLUTION INTRAMUSCULAR; INTRAVENOUS
Status: ACTIVE | COMMUNITY
Start: 2021-12-17

## 2022-01-27 ENCOUNTER — TELEPHONE ENCOUNTER (OUTPATIENT)
Dept: URBAN - METROPOLITAN AREA CLINIC 78 | Facility: CLINIC | Age: 27
End: 2022-01-27

## 2022-01-31 ENCOUNTER — OFFICE VISIT (OUTPATIENT)
Dept: URBAN - METROPOLITAN AREA CLINIC 78 | Facility: CLINIC | Age: 27
End: 2022-01-31

## 2022-02-02 ENCOUNTER — OFFICE VISIT (OUTPATIENT)
Dept: URBAN - METROPOLITAN AREA CLINIC 78 | Facility: CLINIC | Age: 27
End: 2022-02-02
Payer: COMMERCIAL

## 2022-02-02 ENCOUNTER — TELEPHONE ENCOUNTER (OUTPATIENT)
Dept: URBAN - METROPOLITAN AREA CLINIC 78 | Facility: CLINIC | Age: 27
End: 2022-02-02

## 2022-02-02 VITALS
BODY MASS INDEX: 19.66 KG/M2 | WEIGHT: 118 LBS | RESPIRATION RATE: 16 BRPM | DIASTOLIC BLOOD PRESSURE: 83 MMHG | TEMPERATURE: 97.6 F | SYSTOLIC BLOOD PRESSURE: 140 MMHG | HEIGHT: 65 IN | HEART RATE: 88 BPM

## 2022-02-02 DIAGNOSIS — N89.0 VAGINAL INTRAEPITHELIAL NEOPLASIA GRADE 1: ICD-10-CM

## 2022-02-02 DIAGNOSIS — G89.29 OTHER CHRONIC PAIN: ICD-10-CM

## 2022-02-02 DIAGNOSIS — K51.90 ULCERATIVE COLITIS: ICD-10-CM

## 2022-02-02 DIAGNOSIS — M25.562 PAIN IN LEFT KNEE: ICD-10-CM

## 2022-02-02 DIAGNOSIS — R10.30: ICD-10-CM

## 2022-02-02 DIAGNOSIS — E55.9 VITAMIN D DEFICIENCY: ICD-10-CM

## 2022-02-02 DIAGNOSIS — M25.552 PAIN IN LEFT HIP: ICD-10-CM

## 2022-02-02 PROBLEM — 49218002: Status: ACTIVE | Noted: 2022-02-02

## 2022-02-02 PROCEDURE — 99214 OFFICE O/P EST MOD 30 MIN: CPT | Performed by: INTERNAL MEDICINE

## 2022-02-02 RX ORDER — MESALAMINE 1.2 G/1
2 TABLETS TABLET, DELAYED RELEASE ORAL ONCE A DAY
OUTPATIENT
Start: 2021-05-19

## 2022-02-02 RX ORDER — INFLIXIMAB 100 MG/10ML
INFUSE 5 MG/KG OVER NO LESS THAN 2 HOUR(S) BY INTRAVENOUS ROUTE INJECTION, POWDER, LYOPHILIZED, FOR SOLUTION INTRAVENOUS
OUTPATIENT

## 2022-02-02 RX ORDER — SOY PROTEIN
POWDER (GRAM) ORAL
Qty: 0 | Refills: 0 | Status: ON HOLD | COMMUNITY
Start: 1900-01-01

## 2022-02-02 RX ORDER — MESALAMINE 1.2 G/1
2 TABLETS TABLET, DELAYED RELEASE ORAL ONCE A DAY
Qty: 180 TABLET | Refills: 0 | Status: ACTIVE | COMMUNITY
Start: 2021-05-19

## 2022-02-02 RX ORDER — HYDROCORTISONE SODIUM SUCCINATE 100 MG/2ML
AS DIRECTED INJECTION, POWDER, FOR SOLUTION INTRAMUSCULAR; INTRAVENOUS
Status: ACTIVE | COMMUNITY
Start: 2021-12-17

## 2022-02-02 RX ORDER — INFLIXIMAB 100 MG/10ML
INFUSE 5 MG/KG OVER NO LESS THAN 2 HOUR(S) BY INTRAVENOUS ROUTE INJECTION, POWDER, LYOPHILIZED, FOR SOLUTION INTRAVENOUS
Qty: 1 | Refills: 0 | Status: ACTIVE | COMMUNITY

## 2022-02-02 RX ORDER — SACCHAROMYCES BOULARDII 250 MG
CAPSULE ORAL
Qty: 0 | Refills: 0 | Status: ON HOLD | COMMUNITY
Start: 1900-01-01

## 2022-02-02 RX ORDER — MESALAMINE 1.2 G/1
2 TABLETS TABLET, DELAYED RELEASE ORAL ONCE A DAY
Qty: 180 TABLET | Refills: 0 | Status: ACTIVE | COMMUNITY

## 2022-02-02 NOTE — HPI-TODAY'S VISIT:
Patient is seen in follow up   Patient reports joint pain /ankle pain   Patient got first and second dose of Inflectra ( cold sores )   Jan 5/22 : second dose  Patient states that she had a growth in vaginal area  Jan 20  s/p Biopsy low grade precancerous cells ..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   Remicade denied y Insurance company  Stelara she has adverse effects  Inflectra initiated : Patient appears to have a growth requiring intervention.    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   [facility] : BM q 3-4 /day , urgency  No rectal bleeding    Patient reports pain in left side   Patient has Lialda at home   Reicade in Jan and last in May ( even while )  Patient reports getting Stelara infusion  She tolerated Stelara  infusion well  Patient states that she is fine except  cold sore and drop in sexual drive  She has seen other people report on patient support group  This particular side effect : low libido despite being in clinical remission is not acceptible  She is not on Fluxetine ( she discontinued after 3 days  it because it did not help )  She states that Concerta did not effect he libido in past  She also states that Remicade did not effect it either   Overall she feels well and denies anxiety or depression   Patient states that right after Stelara  she did have hemorrhoid which had since resolved .  [facility] : BM q 1 day No rectal bleeding  No pain  No urgency or tenesmus  She exercises regularly an diet is ok  Weight is ok  Last labs were with PCP in Dec 2020  Vit D  59  Vit B 12 :299    CBC Hg 14.0 Hct 42.9 Platelet 311 CMP Normal   Last colonoscopy 10/20 : Patchy inactive colitis in left side of colon and rectum ( random bx from right and miiddle part of colon reveal histological remission ) Hx of shingles post surgery ( Rhinoplasty )  , resolved  Has mild Raynauds  Has d/c Fluoxetine  Has d/c meds for ADHD  Patient saw Blu PATEL on Dec 20 2019  She has a dx with ADHD and anxiety Denies Etoh use Last Derm exam: sees one regularly .... Last PAP smear last year  ( Zofia Ramirez ) : She is sexually active and admits to using precautions . Currently nt on OCP Summarizing : Dx with pancolitis in 2010 (pt known to me )and then she transferred to [place] 2015 and have relocating back .

## 2022-02-10 ENCOUNTER — OFFICE VISIT (OUTPATIENT)
Dept: URBAN - METROPOLITAN AREA CLINIC 97 | Facility: CLINIC | Age: 27
End: 2022-02-10

## 2022-02-17 ENCOUNTER — TELEPHONE ENCOUNTER (OUTPATIENT)
Dept: URBAN - METROPOLITAN AREA CLINIC 78 | Facility: CLINIC | Age: 27
End: 2022-02-17

## 2022-02-22 ENCOUNTER — TELEPHONE ENCOUNTER (OUTPATIENT)
Dept: URBAN - METROPOLITAN AREA CLINIC 78 | Facility: CLINIC | Age: 27
End: 2022-02-22

## 2022-02-23 ENCOUNTER — TELEPHONE ENCOUNTER (OUTPATIENT)
Dept: URBAN - METROPOLITAN AREA CLINIC 78 | Facility: CLINIC | Age: 27
End: 2022-02-23

## 2022-02-28 ENCOUNTER — TELEPHONE ENCOUNTER (OUTPATIENT)
Dept: URBAN - METROPOLITAN AREA CLINIC 78 | Facility: CLINIC | Age: 27
End: 2022-02-28

## 2022-03-04 ENCOUNTER — TELEPHONE ENCOUNTER (OUTPATIENT)
Dept: URBAN - METROPOLITAN AREA CLINIC 97 | Facility: CLINIC | Age: 27
End: 2022-03-04

## 2022-03-07 ENCOUNTER — OFFICE VISIT (OUTPATIENT)
Dept: URBAN - METROPOLITAN AREA CLINIC 97 | Facility: CLINIC | Age: 27
End: 2022-03-07
Payer: COMMERCIAL

## 2022-03-07 VITALS
HEIGHT: 65 IN | BODY MASS INDEX: 19.99 KG/M2 | WEIGHT: 120 LBS | HEART RATE: 95 BPM | TEMPERATURE: 97.5 F | SYSTOLIC BLOOD PRESSURE: 148 MMHG | RESPIRATION RATE: 16 BRPM | DIASTOLIC BLOOD PRESSURE: 80 MMHG

## 2022-03-07 DIAGNOSIS — K51.80 CHRONIC PANCOLONIC ULCERATIVE COLITIS: ICD-10-CM

## 2022-03-07 PROCEDURE — 96375 TX/PRO/DX INJ NEW DRUG ADDON: CPT | Performed by: INTERNAL MEDICINE

## 2022-03-07 PROCEDURE — 96415 CHEMO IV INFUSION ADDL HR: CPT | Performed by: INTERNAL MEDICINE

## 2022-03-07 PROCEDURE — 96413 CHEMO IV INFUSION 1 HR: CPT | Performed by: INTERNAL MEDICINE

## 2022-03-07 RX ORDER — SOY PROTEIN
POWDER (GRAM) ORAL
Qty: 0 | Refills: 0 | Status: ON HOLD | COMMUNITY
Start: 1900-01-01

## 2022-03-07 RX ORDER — MESALAMINE 1.2 G/1
2 TABLETS TABLET, DELAYED RELEASE ORAL ONCE A DAY
Status: ACTIVE | COMMUNITY
Start: 2021-05-19

## 2022-03-07 RX ORDER — MESALAMINE 1.2 G/1
2 TABLETS TABLET, DELAYED RELEASE ORAL ONCE A DAY
Qty: 180 TABLET | Refills: 0 | Status: ACTIVE | COMMUNITY

## 2022-03-07 RX ORDER — HYDROCORTISONE SODIUM SUCCINATE 100 MG/2ML
AS DIRECTED INJECTION, POWDER, FOR SOLUTION INTRAMUSCULAR; INTRAVENOUS
Status: ACTIVE | COMMUNITY
Start: 2021-12-17

## 2022-03-07 RX ORDER — SACCHAROMYCES BOULARDII 250 MG
CAPSULE ORAL
Qty: 0 | Refills: 0 | Status: ON HOLD | COMMUNITY
Start: 1900-01-01

## 2022-03-08 ENCOUNTER — TELEPHONE ENCOUNTER (OUTPATIENT)
Dept: URBAN - METROPOLITAN AREA CLINIC 78 | Facility: CLINIC | Age: 27
End: 2022-03-08

## 2022-03-23 ENCOUNTER — TELEPHONE ENCOUNTER (OUTPATIENT)
Dept: URBAN - METROPOLITAN AREA CLINIC 78 | Facility: CLINIC | Age: 27
End: 2022-03-23

## 2022-03-23 RX ORDER — INFLIXIMAB 100 MG/10ML
INFUSE 10 MG/KG OVER NO LESS THAN 2 HOUR(S) BY INTRAVENOUS ROUTE INJECTION, POWDER, LYOPHILIZED, FOR SOLUTION INTRAVENOUS
Refills: 3 | OUTPATIENT

## 2022-03-30 ENCOUNTER — TELEPHONE ENCOUNTER (OUTPATIENT)
Dept: URBAN - METROPOLITAN AREA CLINIC 78 | Facility: CLINIC | Age: 27
End: 2022-03-30

## 2022-03-30 RX ORDER — INFLIXIMAB 100 MG/10ML
INFUSE 5MG/KG INJECTION, POWDER, LYOPHILIZED, FOR SOLUTION INTRAVENOUS
Refills: 0 | OUTPATIENT
Start: 2022-03-31

## 2022-03-30 RX ORDER — INFLIXIMAB 100 MG/10ML
INFUSE 5MG/KG OVER NO LESS THAN 2 HOUR(S) BY INTRAVENOUS ROUTE INJECTION, POWDER, LYOPHILIZED, FOR SOLUTION INTRAVENOUS
Refills: 3 | OUTPATIENT

## 2022-04-08 ENCOUNTER — TELEPHONE ENCOUNTER (OUTPATIENT)
Dept: URBAN - METROPOLITAN AREA CLINIC 78 | Facility: CLINIC | Age: 27
End: 2022-04-08

## 2022-04-08 RX ORDER — INFLIXIMAB 100 MG/10ML
INFUSE 5MG/KG OVER NO LESS THAN 2 HOUR(S) BY INTRAVENOUS ROUTE INJECTION, POWDER, LYOPHILIZED, FOR SOLUTION INTRAVENOUS
Qty: 1 | Refills: 10

## 2022-04-08 RX ORDER — INFLIXIMAB 100 MG/10ML
INFUSE 5MG/KG INJECTION, POWDER, LYOPHILIZED, FOR SOLUTION INTRAVENOUS
Qty: 3 | Refills: 0

## 2022-04-15 ENCOUNTER — TELEPHONE ENCOUNTER (OUTPATIENT)
Dept: URBAN - METROPOLITAN AREA CLINIC 98 | Facility: CLINIC | Age: 27
End: 2022-04-15

## 2022-04-15 ENCOUNTER — TELEPHONE ENCOUNTER (OUTPATIENT)
Dept: URBAN - METROPOLITAN AREA CLINIC 97 | Facility: CLINIC | Age: 27
End: 2022-04-15

## 2022-04-18 ENCOUNTER — OFFICE VISIT (OUTPATIENT)
Dept: URBAN - METROPOLITAN AREA CLINIC 97 | Facility: CLINIC | Age: 27
End: 2022-04-18
Payer: COMMERCIAL

## 2022-04-18 VITALS
WEIGHT: 120 LBS | SYSTOLIC BLOOD PRESSURE: 106 MMHG | HEART RATE: 89 BPM | RESPIRATION RATE: 16 BRPM | BODY MASS INDEX: 19.99 KG/M2 | DIASTOLIC BLOOD PRESSURE: 72 MMHG | HEIGHT: 65 IN | TEMPERATURE: 97.1 F

## 2022-04-18 DIAGNOSIS — K51.80 CHRONIC PANCOLONIC ULCERATIVE COLITIS: ICD-10-CM

## 2022-04-18 PROCEDURE — 96415 CHEMO IV INFUSION ADDL HR: CPT | Performed by: INTERNAL MEDICINE

## 2022-04-18 PROCEDURE — 96413 CHEMO IV INFUSION 1 HR: CPT | Performed by: INTERNAL MEDICINE

## 2022-04-18 PROCEDURE — 96375 TX/PRO/DX INJ NEW DRUG ADDON: CPT | Performed by: INTERNAL MEDICINE

## 2022-04-18 RX ORDER — MESALAMINE 1.2 G/1
2 TABLETS TABLET, DELAYED RELEASE ORAL ONCE A DAY
Qty: 180 TABLET | Refills: 0 | Status: ACTIVE | COMMUNITY

## 2022-04-18 RX ORDER — SOY PROTEIN
POWDER (GRAM) ORAL
Qty: 0 | Refills: 0 | Status: ON HOLD | COMMUNITY
Start: 1900-01-01

## 2022-04-18 RX ORDER — HYDROCORTISONE SODIUM SUCCINATE 100 MG/2ML
AS DIRECTED INJECTION, POWDER, FOR SOLUTION INTRAMUSCULAR; INTRAVENOUS
Status: ACTIVE | COMMUNITY
Start: 2021-12-17

## 2022-04-18 RX ORDER — INFLIXIMAB 100 MG/10ML
INFUSE 5MG/KG INJECTION, POWDER, LYOPHILIZED, FOR SOLUTION INTRAVENOUS
Qty: 3 | Refills: 0 | Status: ACTIVE | COMMUNITY

## 2022-04-18 RX ORDER — SACCHAROMYCES BOULARDII 250 MG
CAPSULE ORAL
Qty: 0 | Refills: 0 | Status: ON HOLD | COMMUNITY
Start: 1900-01-01

## 2022-04-18 RX ORDER — MESALAMINE 1.2 G/1
2 TABLETS TABLET, DELAYED RELEASE ORAL ONCE A DAY
Status: ACTIVE | COMMUNITY
Start: 2021-05-19

## 2022-04-18 RX ORDER — INFLIXIMAB 100 MG/10ML
INFUSE 5MG/KG OVER NO LESS THAN 2 HOUR(S) BY INTRAVENOUS ROUTE INJECTION, POWDER, LYOPHILIZED, FOR SOLUTION INTRAVENOUS
Qty: 1 | Refills: 10 | Status: ACTIVE | COMMUNITY

## 2022-04-26 ENCOUNTER — TELEPHONE ENCOUNTER (OUTPATIENT)
Dept: URBAN - METROPOLITAN AREA CLINIC 78 | Facility: CLINIC | Age: 27
End: 2022-04-26

## 2022-04-27 ENCOUNTER — OFFICE VISIT (OUTPATIENT)
Dept: URBAN - METROPOLITAN AREA CLINIC 78 | Facility: CLINIC | Age: 27
End: 2022-04-27
Payer: COMMERCIAL

## 2022-04-27 VITALS
HEIGHT: 65 IN | WEIGHT: 122.4 LBS | HEART RATE: 80 BPM | SYSTOLIC BLOOD PRESSURE: 118 MMHG | BODY MASS INDEX: 20.39 KG/M2 | TEMPERATURE: 97.8 F | DIASTOLIC BLOOD PRESSURE: 82 MMHG

## 2022-04-27 DIAGNOSIS — B00.1 RECURRENT COLD SORES: ICD-10-CM

## 2022-04-27 DIAGNOSIS — T80.90XS INFUSION REACTION, SEQUELA: ICD-10-CM

## 2022-04-27 DIAGNOSIS — K51.019 ULCERATIVE PANCOLITIS WITH COMPLICATION: ICD-10-CM

## 2022-04-27 DIAGNOSIS — G89.29 OTHER CHRONIC PAIN: ICD-10-CM

## 2022-04-27 PROCEDURE — 99213 OFFICE O/P EST LOW 20 MIN: CPT | Performed by: INTERNAL MEDICINE

## 2022-04-27 RX ORDER — LIDOCAINE 5% 5 G/100G
AS DIRECTED CREAM TOPICAL
Status: ACTIVE | COMMUNITY

## 2022-04-27 RX ORDER — MESALAMINE 1.2 G/1
2 TABLETS TABLET, DELAYED RELEASE ORAL ONCE A DAY
Qty: 180 TABLET | Refills: 0 | Status: DISCONTINUED | COMMUNITY

## 2022-04-27 RX ORDER — INFLIXIMAB 100 MG/10ML
INFUSE 5MG/KG INJECTION, POWDER, LYOPHILIZED, FOR SOLUTION INTRAVENOUS
Qty: 3 | Refills: 0 | Status: DISCONTINUED | COMMUNITY

## 2022-04-27 RX ORDER — HYDROCORTISONE SODIUM SUCCINATE 100 MG/2ML
AS DIRECTED INJECTION, POWDER, FOR SOLUTION INTRAMUSCULAR; INTRAVENOUS
Status: DISCONTINUED | COMMUNITY
Start: 2021-12-17

## 2022-04-27 RX ORDER — OCTISALATE, AVOBENZONE, HOMOSALATE, AND OCTOCRYLENE 29.4; 29.4; 49; 25.48 MG/ML; MG/ML; MG/ML; MG/ML
AS DIRECTED LOTION TOPICAL
Refills: 0 | Status: ACTIVE | COMMUNITY
Start: 1900-01-01

## 2022-04-27 RX ORDER — MESALAMINE 1.2 G/1
2 TABLETS TABLET, DELAYED RELEASE ORAL ONCE A DAY
Status: ACTIVE | COMMUNITY
Start: 2021-05-19

## 2022-04-27 RX ORDER — ESTRADIOL 0.1 MG/G
AS DIRECTED CREAM VAGINAL
Status: ACTIVE | COMMUNITY

## 2022-04-27 RX ORDER — NORTRIPTYLINE HYDROCHLORIDE 25 MG/1
AS DIRECTED CAPSULE ORAL ONCE A DAY
Refills: 0 | Status: ACTIVE | COMMUNITY

## 2022-04-27 RX ORDER — MESALAMINE 1.2 G/1
2 TABLETS TABLET, DELAYED RELEASE ORAL ONCE A DAY
Qty: 180 TABLET | Refills: 3

## 2022-04-27 RX ORDER — INFLIXIMAB 100 MG/10ML
AS DIRECTED INJECTION, POWDER, LYOPHILIZED, FOR SOLUTION INTRAVENOUS
Refills: 10 | Status: ACTIVE | COMMUNITY

## 2022-04-27 RX ORDER — SACCHAROMYCES BOULARDII 250 MG
CAPSULE ORAL
Qty: 0 | Refills: 0 | Status: DISCONTINUED | COMMUNITY
Start: 1900-01-01

## 2022-04-27 RX ORDER — INFLIXIMAB 100 MG/10ML
INFUSE 5MG/KG OVER NO LESS THAN 2 HOUR(S) BY INTRAVENOUS ROUTE INJECTION, POWDER, LYOPHILIZED, FOR SOLUTION INTRAVENOUS
Qty: 1 | Refills: 10

## 2022-04-27 RX ORDER — SOY PROTEIN
POWDER (GRAM) ORAL
Qty: 0 | Refills: 0 | Status: DISCONTINUED | COMMUNITY
Start: 1900-01-01

## 2022-04-27 RX ORDER — INFLIXIMAB 100 MG/10ML
INFUSE 5MG/KG INJECTION, POWDER, LYOPHILIZED, FOR SOLUTION INTRAVENOUS
Qty: 3 | Refills: 0

## 2022-04-27 NOTE — HPI-TODAY'S VISIT:
Patient is seen in follow up  after her Remicade infusion 4/18/2022  ( she was predmedicate with Benedryl and Cortisoone )  Patient is experiencing symptoms : low grade Tmax 100 same day of infusion and cold sore  She took Valtrex on on saturday which helped with cold sore ( Rx by Dr Trinidad)   (-)  Denies swelling of lips or tongue or mouth  (-) breathing problems   On the bright side her ankle pain is resolved  Left lower quadrant tenderness is improved  Denies diarrhea  Does have fissure and is seeing Dr Blu Juarez  Going to PT 2 x week   Patient states that Nortryptilline has made a huge difference in shooting pain /weird pain    Patient got first and second dose of Inflectra ( cold sores )   Jan 5/22 : second dose  Patient states that she had a growth in vaginal area  Jan 20  s/p Biopsy low grade precancerous cells ..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   Remicade denied y Insurance company  Stelara she has adverse effects  Inflectra initiated : Patient appears to have a growth requiring intervention.    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   [facility] : BM q 3-4 /day , urgency  No rectal bleeding    Patient reports pain in left side   Patient has Lialda at home   Reicade in Jan and last in May ( even while )  Patient reports getting Stelara infusion  She tolerated Stelara  infusion well  Patient states that she is fine except  cold sore and drop in sexual drive  She has seen other people report on patient support group  This particular side effect : low libido despite being in clinical remission is not acceptible  She is not on Fluxetine ( she discontinued after 3 days  it because it did not help )  She states that Concerta did not effect he libido in past  She also states that Remicade did not effect it either   Overall she feels well and denies anxiety or depression   Patient states that right after Stelara  she did have hemorrhoid which had since resolved .  [facility] : BM q 1 day No rectal bleeding  No pain  No urgency or tenesmus  She exercises regularly an diet is ok  Weight is ok  Last labs were with PCP in Dec 2020  Vit D  59  Vit B 12 :299    CBC Hg 14.0 Hct 42.9 Platelet 311 CMP Normal   Last colonoscopy 10/20 : Patchy inactive colitis in left side of colon and rectum ( random bx from right and miiddle part of colon reveal histological remission ) Hx of shingles post surgery ( Rhinoplasty )  , resolved  Has mild Raynauds  Has d/c Fluoxetine  Has d/c meds for ADHD  Patient saw Blu PATEL on Dec 20 2019  She has a dx with ADHD and anxiety Denies Etoh use Last Derm exam: sees one regularly .... Last PAP smear last year  ( Zofia Ramirez ) : She is sexually active and admits to using precautions . Currently nt on OCP Summarizing : Dx with pancolitis in 2010 (pt known to me )and then she transferred to [place] 2015 and have relocating back .

## 2022-04-29 ENCOUNTER — TELEPHONE ENCOUNTER (OUTPATIENT)
Dept: URBAN - METROPOLITAN AREA CLINIC 78 | Facility: CLINIC | Age: 27
End: 2022-04-29

## 2022-04-29 RX ORDER — OCTISALATE, AVOBENZONE, HOMOSALATE, AND OCTOCRYLENE 29.4; 29.4; 49; 25.48 MG/ML; MG/ML; MG/ML; MG/ML
AS DIRECTED LOTION TOPICAL
Refills: 0 | Status: ACTIVE | COMMUNITY
Start: 1900-01-01

## 2022-04-29 RX ORDER — INFLIXIMAB 100 MG/10ML
INFUSE 5MG/KG OVER NO LESS THAN 2 HOUR(S) BY INTRAVENOUS ROUTE INJECTION, POWDER, LYOPHILIZED, FOR SOLUTION INTRAVENOUS
Qty: 1 | Refills: 10 | Status: ACTIVE | COMMUNITY

## 2022-04-29 RX ORDER — NORTRIPTYLINE HYDROCHLORIDE 25 MG/1
AS DIRECTED CAPSULE ORAL ONCE A DAY
Refills: 0 | Status: ACTIVE | COMMUNITY

## 2022-04-29 RX ORDER — INFLIXIMAB 100 MG/10ML
INFUSE 5MG/KG INJECTION, POWDER, LYOPHILIZED, FOR SOLUTION INTRAVENOUS
Qty: 3 | Refills: 0 | Status: ACTIVE | COMMUNITY

## 2022-04-29 RX ORDER — MESALAMINE 1.2 G/1
2 TABLETS TABLET, DELAYED RELEASE ORAL ONCE A DAY
Qty: 180 TABLET | Refills: 3 | Status: ACTIVE | COMMUNITY

## 2022-04-29 RX ORDER — SODIUM SULFATE, MAGNESIUM SULFATE, AND POTASSIUM CHLORIDE 17.75; 2.7; 2.25 G/1; G/1; G/1
12 TABLETS TABLET ORAL
Qty: 24 TABLETS | Refills: 0 | OUTPATIENT
Start: 2022-05-02 | End: 2022-05-03

## 2022-04-29 RX ORDER — ESTRADIOL 0.1 MG/G
AS DIRECTED CREAM VAGINAL
Status: ACTIVE | COMMUNITY

## 2022-04-29 RX ORDER — LIDOCAINE 5% 5 G/100G
AS DIRECTED CREAM TOPICAL
Status: ACTIVE | COMMUNITY

## 2022-05-02 PROBLEM — 444548001: Status: ACTIVE | Noted: 2022-05-02

## 2022-05-09 ENCOUNTER — TELEPHONE ENCOUNTER (OUTPATIENT)
Dept: URBAN - METROPOLITAN AREA CLINIC 78 | Facility: CLINIC | Age: 27
End: 2022-05-09

## 2022-05-09 RX ORDER — MESALAMINE 1.2 G/1
BRAND NAME ONLY** 2 TABLETS TABLET, DELAYED RELEASE ORAL ONCE A DAY
Qty: 180 | Refills: 3

## 2022-05-17 ENCOUNTER — TELEPHONE ENCOUNTER (OUTPATIENT)
Dept: URBAN - METROPOLITAN AREA CLINIC 78 | Facility: CLINIC | Age: 27
End: 2022-05-17

## 2022-05-17 RX ORDER — SODIUM SULFATE, MAGNESIUM SULFATE, AND POTASSIUM CHLORIDE 17.75; 2.7; 2.25 G/1; G/1; G/1
12 TABLETS TABLET ORAL
Qty: 24 TABLETS | Refills: 0
Start: 2022-05-02 | End: 2022-05-18

## 2022-05-17 RX ORDER — MESALAMINE 1.2 G/1
BRAND NAME ONLY** 2 TABLETS TABLET, DELAYED RELEASE ORAL ONCE A DAY
Qty: 180 | Refills: 3
End: 2023-05-12

## 2022-06-03 PROBLEM — 444548001: Status: ACTIVE | Noted: 2021-05-19

## 2022-06-07 ENCOUNTER — CLAIMS CREATED FROM THE CLAIM WINDOW (OUTPATIENT)
Dept: URBAN - METROPOLITAN AREA CLINIC 4 | Facility: CLINIC | Age: 27
End: 2022-06-07
Payer: COMMERCIAL

## 2022-06-07 ENCOUNTER — OFFICE VISIT (OUTPATIENT)
Dept: URBAN - METROPOLITAN AREA SURGERY CENTER 15 | Facility: SURGERY CENTER | Age: 27
End: 2022-06-07
Payer: COMMERCIAL

## 2022-06-07 DIAGNOSIS — K51.00 ACUTE ULCERATIVE PANCOLITIS: ICD-10-CM

## 2022-06-07 DIAGNOSIS — K51.80 OTHER ULCERATIVE COLITIS WITHOUT COMPLICATIONS: ICD-10-CM

## 2022-06-07 PROCEDURE — 45380 COLONOSCOPY AND BIOPSY: CPT | Performed by: INTERNAL MEDICINE

## 2022-06-07 PROCEDURE — G8907 PT DOC NO EVENTS ON DISCHARG: HCPCS | Performed by: INTERNAL MEDICINE

## 2022-06-07 PROCEDURE — 88305 TISSUE EXAM BY PATHOLOGIST: CPT | Performed by: PATHOLOGY

## 2022-06-08 ENCOUNTER — OFFICE VISIT (OUTPATIENT)
Dept: URBAN - METROPOLITAN AREA CLINIC 77 | Facility: CLINIC | Age: 27
End: 2022-06-08
Payer: COMMERCIAL

## 2022-06-08 DIAGNOSIS — E53.8 VITAMIN B12 DEFICIENCY: ICD-10-CM

## 2022-06-08 DIAGNOSIS — E55.9 VITAMIN D DEFICIENCY: ICD-10-CM

## 2022-06-08 PROCEDURE — 96372 THER/PROPH/DIAG INJ SC/IM: CPT | Performed by: INTERNAL MEDICINE

## 2022-06-08 RX ORDER — OCTISALATE, AVOBENZONE, HOMOSALATE, AND OCTOCRYLENE 29.4; 29.4; 49; 25.48 MG/ML; MG/ML; MG/ML; MG/ML
AS DIRECTED LOTION TOPICAL
Refills: 0 | Status: ACTIVE | COMMUNITY
Start: 1900-01-01

## 2022-06-08 RX ORDER — ESTRADIOL 0.1 MG/G
AS DIRECTED CREAM VAGINAL
Status: ACTIVE | COMMUNITY

## 2022-06-08 RX ORDER — NORTRIPTYLINE HYDROCHLORIDE 25 MG/1
AS DIRECTED CAPSULE ORAL ONCE A DAY
Refills: 0 | Status: ACTIVE | COMMUNITY

## 2022-06-08 RX ORDER — LIDOCAINE 5% 5 G/100G
AS DIRECTED CREAM TOPICAL
Status: ACTIVE | COMMUNITY

## 2022-06-08 RX ORDER — INFLIXIMAB 100 MG/10ML
INFUSE 5MG/KG INJECTION, POWDER, LYOPHILIZED, FOR SOLUTION INTRAVENOUS
Qty: 3 | Refills: 0 | Status: ACTIVE | COMMUNITY

## 2022-06-08 RX ORDER — MESALAMINE 1.2 G/1
BRAND NAME ONLY** 2 TABLETS TABLET, DELAYED RELEASE ORAL ONCE A DAY
Qty: 180 | Refills: 3 | Status: ACTIVE | COMMUNITY
End: 2023-05-12

## 2022-06-08 RX ORDER — INFLIXIMAB 100 MG/10ML
INFUSE 5MG/KG OVER NO LESS THAN 2 HOUR(S) BY INTRAVENOUS ROUTE INJECTION, POWDER, LYOPHILIZED, FOR SOLUTION INTRAVENOUS
Qty: 1 | Refills: 10 | Status: ACTIVE | COMMUNITY

## 2022-06-13 ENCOUNTER — OFFICE VISIT (OUTPATIENT)
Dept: URBAN - METROPOLITAN AREA CLINIC 97 | Facility: CLINIC | Age: 27
End: 2022-06-13

## 2022-06-14 ENCOUNTER — OFFICE VISIT (OUTPATIENT)
Dept: URBAN - METROPOLITAN AREA SURGERY CENTER 15 | Facility: SURGERY CENTER | Age: 27
End: 2022-06-14

## 2022-06-16 ENCOUNTER — OFFICE VISIT (OUTPATIENT)
Dept: URBAN - METROPOLITAN AREA CLINIC 97 | Facility: CLINIC | Age: 27
End: 2022-06-16
Payer: COMMERCIAL

## 2022-06-16 VITALS
TEMPERATURE: 96.9 F | WEIGHT: 120 LBS | DIASTOLIC BLOOD PRESSURE: 92 MMHG | SYSTOLIC BLOOD PRESSURE: 151 MMHG | HEART RATE: 97 BPM | BODY MASS INDEX: 19.99 KG/M2 | RESPIRATION RATE: 16 BRPM | HEIGHT: 65 IN

## 2022-06-16 DIAGNOSIS — K51.80 CHRONIC PANCOLONIC ULCERATIVE COLITIS: ICD-10-CM

## 2022-06-16 PROCEDURE — 96375 TX/PRO/DX INJ NEW DRUG ADDON: CPT | Performed by: INTERNAL MEDICINE

## 2022-06-16 PROCEDURE — 96413 CHEMO IV INFUSION 1 HR: CPT | Performed by: INTERNAL MEDICINE

## 2022-06-16 PROCEDURE — 96415 CHEMO IV INFUSION ADDL HR: CPT | Performed by: INTERNAL MEDICINE

## 2022-06-16 RX ORDER — ESTRADIOL 0.1 MG/G
AS DIRECTED CREAM VAGINAL
Status: ACTIVE | COMMUNITY

## 2022-06-16 RX ORDER — NORTRIPTYLINE HYDROCHLORIDE 25 MG/1
AS DIRECTED CAPSULE ORAL ONCE A DAY
Refills: 0 | Status: ACTIVE | COMMUNITY

## 2022-06-16 RX ORDER — OCTISALATE, AVOBENZONE, HOMOSALATE, AND OCTOCRYLENE 29.4; 29.4; 49; 25.48 MG/ML; MG/ML; MG/ML; MG/ML
AS DIRECTED LOTION TOPICAL
Refills: 0 | Status: ACTIVE | COMMUNITY
Start: 1900-01-01

## 2022-06-16 RX ORDER — INFLIXIMAB 100 MG/10ML
INFUSE 5MG/KG OVER NO LESS THAN 2 HOUR(S) BY INTRAVENOUS ROUTE INJECTION, POWDER, LYOPHILIZED, FOR SOLUTION INTRAVENOUS
Qty: 1 | Refills: 10 | Status: ACTIVE | COMMUNITY

## 2022-06-16 RX ORDER — LIDOCAINE 5% 5 G/100G
AS DIRECTED CREAM TOPICAL
Status: ACTIVE | COMMUNITY

## 2022-06-16 RX ORDER — MESALAMINE 1.2 G/1
BRAND NAME ONLY** 2 TABLETS TABLET, DELAYED RELEASE ORAL ONCE A DAY
Qty: 180 | Refills: 3 | Status: ACTIVE | COMMUNITY
End: 2023-05-12

## 2022-06-16 RX ORDER — INFLIXIMAB 100 MG/10ML
INFUSE 5MG/KG INJECTION, POWDER, LYOPHILIZED, FOR SOLUTION INTRAVENOUS
Qty: 3 | Refills: 0 | Status: ACTIVE | COMMUNITY

## 2022-06-27 ENCOUNTER — OFFICE VISIT (OUTPATIENT)
Dept: URBAN - METROPOLITAN AREA CLINIC 78 | Facility: CLINIC | Age: 27
End: 2022-06-27
Payer: COMMERCIAL

## 2022-06-27 VITALS
HEART RATE: 118 BPM | BODY MASS INDEX: 19.93 KG/M2 | HEIGHT: 65 IN | SYSTOLIC BLOOD PRESSURE: 129 MMHG | DIASTOLIC BLOOD PRESSURE: 84 MMHG | TEMPERATURE: 97.6 F | WEIGHT: 119.6 LBS | RESPIRATION RATE: 16 BRPM

## 2022-06-27 DIAGNOSIS — R10.2 PELVIC PAIN: ICD-10-CM

## 2022-06-27 DIAGNOSIS — E55.9 VITAMIN D DEFICIENCY: ICD-10-CM

## 2022-06-27 DIAGNOSIS — E53.8 VITAMIN B12 DEFICIENCY: ICD-10-CM

## 2022-06-27 DIAGNOSIS — K51.019 ULCERATIVE PANCOLITIS WITH COMPLICATION: ICD-10-CM

## 2022-06-27 PROCEDURE — 99213 OFFICE O/P EST LOW 20 MIN: CPT | Performed by: INTERNAL MEDICINE

## 2022-06-27 RX ORDER — OCTISALATE, AVOBENZONE, HOMOSALATE, AND OCTOCRYLENE 29.4; 29.4; 49; 25.48 MG/ML; MG/ML; MG/ML; MG/ML
AS DIRECTED LOTION TOPICAL
Refills: 0 | Status: ACTIVE | COMMUNITY
Start: 1900-01-01

## 2022-06-27 RX ORDER — INFLIXIMAB 100 MG/10ML
INFUSE 5MG/KG INJECTION, POWDER, LYOPHILIZED, FOR SOLUTION INTRAVENOUS
Qty: 3 | Refills: 0 | Status: ACTIVE | COMMUNITY

## 2022-06-27 RX ORDER — INFLIXIMAB 100 MG/10ML
INFUSE 5MG/KG OVER NO LESS THAN 2 HOUR(S) BY INTRAVENOUS ROUTE INJECTION, POWDER, LYOPHILIZED, FOR SOLUTION INTRAVENOUS
Qty: 1 | Refills: 10 | Status: ACTIVE | COMMUNITY

## 2022-06-27 RX ORDER — LIDOCAINE 5% 5 G/100G
AS DIRECTED CREAM TOPICAL
Status: ACTIVE | COMMUNITY

## 2022-06-27 RX ORDER — ESTRADIOL 0.1 MG/G
AS DIRECTED CREAM VAGINAL
Status: ACTIVE | COMMUNITY

## 2022-06-27 RX ORDER — NORTRIPTYLINE HYDROCHLORIDE 25 MG/1
AS DIRECTED CAPSULE ORAL ONCE A DAY
Refills: 0 | Status: ACTIVE | COMMUNITY

## 2022-06-27 RX ORDER — MESALAMINE 1.2 G/1
BRAND NAME ONLY** 2 TABLETS TABLET, DELAYED RELEASE ORAL ONCE A DAY
Qty: 180 | Refills: 3 | Status: ACTIVE | COMMUNITY
End: 2023-05-12

## 2022-06-27 NOTE — HPI-TODAY'S VISIT:
Patient states that she has seen Derm and  Infectious disease  Patient  states that now she has been has dx with vaginal lichens , has been stated on Clobestol  Patient also thinks she may have have recurrent  yeast infections  Patient is on maintanence Valtrex undercare of Dr Cid   Patient is on Nortryptilline x few months  Patient states that she is seeing pelvic floor therapy  She states that she has been recommended pudendal nerve block    From UC perspective she feels good   Patient has a constellation of symptoms that she is pursing via other specialist  Patient has requested Remicade infusion as Biological of choice for her UC which appears to be undercontrol but she has set of extraluminal neuropathic symptoms    PREVIOUS ENCOUNTER:     Patient is seen in follow up  after her Remicade infusion 4/18/2022  ( she was predmedicate with Benedryl and Cortisoone )  Patient is experiencing symptoms : low grade Tmax 100 same day of infusion and cold sore  She took Valtrex on on saturday which helped with cold sore ( Rx by Dr Trinidad)   (-)  Denies swelling of lips or tongue or mouth  (-) breathing problems   On the bright side her ankle pain is resolved  Left lower quadrant tenderness is improved  Denies diarrhea  Does have fissure and is seeing Dr Blu Juarez  Going to PT 2 x week   Patient states that Nortryptilline has made a huge difference in shooting pain /weird pain    Patient got first and second dose of Inflectra ( cold sores )   Jan 5/22 : second dose  Patient states that she had a growth in vaginal area  Jan 20  s/p Biopsy low grade precancerous cells ..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   Remicade denied y Insurance company  Stelara she has adverse effects  Inflectra initiated : Patient appears to have a growth requiring intervention.    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   [facility] : BM q 3-4 /day , urgency  No rectal bleeding    Patient reports pain in left side   Patient has Lialda at home   Reicade in Jan and last in May ( even while )  Patient reports getting Stelara infusion  She tolerated Stelara  infusion well  Patient states that she is fine except  cold sore and drop in sexual drive  She has seen other people report on patient support group  This particular side effect : low libido despite being in clinical remission is not acceptible  She is not on Fluxetine ( she discontinued after 3 days  it because it did not help )  She states that Concerta did not effect he libido in past  She also states that Remicade did not effect it either   Overall she feels well and denies anxiety or depression   Patient states that right after Stelara  she did have hemorrhoid which had since resolved .  [facility] : BM q 1 day No rectal bleeding  No pain  No urgency or tenesmus  She exercises regularly an diet is ok  Weight is ok  Last labs were with PCP in Dec 2020  Vit D  59  Vit B 12 :299    CBC Hg 14.0 Hct 42.9 Platelet 311 CMP Normal   Last colonoscopy 10/20 : Patchy inactive colitis in left side of colon and rectum ( random bx from right and miiddle part of colon reveal histological remission ) Hx of shingles post surgery ( Rhinoplasty )  , resolved  Has mild Raynauds  Has d/c Fluoxetine  Has d/c meds for ADHD  Patient saw Blu Holguin CRS on Dec 20 2019  She has a dx with ADHD and anxiety Denies Etoh use Last Derm exam: sees one regularly .... Last PAP smear last year  ( Zofia Ramirez ) : She is sexually active and admits to using precautions . Currently nt on OCP Summarizing : Dx with pancolitis in 2010 (pt known to me )and then she transferred to [place] 2015 and have relocating back .

## 2022-07-08 ENCOUNTER — CLAIMS CREATED FROM THE CLAIM WINDOW (OUTPATIENT)
Dept: URBAN - METROPOLITAN AREA CLINIC 77 | Facility: CLINIC | Age: 27
End: 2022-07-08

## 2022-07-08 ENCOUNTER — OFFICE VISIT (OUTPATIENT)
Dept: URBAN - METROPOLITAN AREA CLINIC 77 | Facility: CLINIC | Age: 27
End: 2022-07-08

## 2022-08-12 ENCOUNTER — OFFICE VISIT (OUTPATIENT)
Dept: URBAN - METROPOLITAN AREA CLINIC 77 | Facility: CLINIC | Age: 27
End: 2022-08-12
Payer: COMMERCIAL

## 2022-08-12 ENCOUNTER — TELEPHONE ENCOUNTER (OUTPATIENT)
Dept: URBAN - METROPOLITAN AREA CLINIC 78 | Facility: CLINIC | Age: 27
End: 2022-08-12

## 2022-08-12 DIAGNOSIS — E55.9 VITAMIN D DEFICIENCY: ICD-10-CM

## 2022-08-12 DIAGNOSIS — E53.8 VITAMIN B12 DEFICIENCY: ICD-10-CM

## 2022-08-12 PROBLEM — 191142007 VITAMIN B12 DEFICIENCY ANEMIA DUE TO MALABSORPTION WITH PROTEINURIA: Status: ACTIVE | Noted: 2021-04-30

## 2022-08-12 PROBLEM — 84027009 PERNICIOUS ANEMIA: Status: ACTIVE | Noted: 2021-04-30

## 2022-08-12 PROCEDURE — 96372 THER/PROPH/DIAG INJ SC/IM: CPT | Performed by: INTERNAL MEDICINE

## 2022-08-12 RX ORDER — LIDOCAINE 5% 5 G/100G
AS DIRECTED CREAM TOPICAL
Status: ACTIVE | COMMUNITY

## 2022-08-12 RX ORDER — ESTRADIOL 0.1 MG/G
AS DIRECTED CREAM VAGINAL
Status: ACTIVE | COMMUNITY

## 2022-08-12 RX ORDER — NORTRIPTYLINE HYDROCHLORIDE 25 MG/1
AS DIRECTED CAPSULE ORAL ONCE A DAY
Refills: 0 | Status: ACTIVE | COMMUNITY

## 2022-08-12 RX ORDER — MESALAMINE 1.2 G/1
BRAND NAME ONLY** 2 TABLETS TABLET, DELAYED RELEASE ORAL ONCE A DAY
Qty: 180 | Refills: 3 | Status: ACTIVE | COMMUNITY
End: 2023-05-12

## 2022-08-12 RX ORDER — INFLIXIMAB 100 MG/10ML
INFUSE 5MG/KG INJECTION, POWDER, LYOPHILIZED, FOR SOLUTION INTRAVENOUS
Qty: 3 | Refills: 0 | Status: ACTIVE | COMMUNITY

## 2022-08-12 RX ORDER — INFLIXIMAB 100 MG/10ML
INFUSE 5MG/KG OVER NO LESS THAN 2 HOUR(S) BY INTRAVENOUS ROUTE INJECTION, POWDER, LYOPHILIZED, FOR SOLUTION INTRAVENOUS
Qty: 1 | Refills: 10 | Status: ACTIVE | COMMUNITY

## 2022-08-12 RX ORDER — OCTISALATE, AVOBENZONE, HOMOSALATE, AND OCTOCRYLENE 29.4; 29.4; 49; 25.48 MG/ML; MG/ML; MG/ML; MG/ML
AS DIRECTED LOTION TOPICAL
Refills: 0 | Status: ACTIVE | COMMUNITY
Start: 1900-01-01

## 2022-09-08 ENCOUNTER — TELEPHONE ENCOUNTER (OUTPATIENT)
Dept: URBAN - METROPOLITAN AREA CLINIC 97 | Facility: CLINIC | Age: 27
End: 2022-09-08

## 2022-09-20 ENCOUNTER — TELEPHONE ENCOUNTER (OUTPATIENT)
Dept: URBAN - METROPOLITAN AREA CLINIC 78 | Facility: CLINIC | Age: 27
End: 2022-09-20

## 2022-09-22 ENCOUNTER — TELEPHONE ENCOUNTER (OUTPATIENT)
Dept: URBAN - METROPOLITAN AREA CLINIC 78 | Facility: CLINIC | Age: 27
End: 2022-09-22

## 2022-09-23 ENCOUNTER — OFFICE VISIT (OUTPATIENT)
Dept: URBAN - METROPOLITAN AREA CLINIC 77 | Facility: CLINIC | Age: 27
End: 2022-09-23
Payer: COMMERCIAL

## 2022-09-23 DIAGNOSIS — R10.2 ACUTE PELVIC PAIN: ICD-10-CM

## 2022-09-23 PROCEDURE — 83014 H PYLORI DRUG ADMIN: CPT | Performed by: INTERNAL MEDICINE

## 2022-09-23 RX ORDER — MESALAMINE 1.2 G/1
BRAND NAME ONLY** 2 TABLETS TABLET, DELAYED RELEASE ORAL ONCE A DAY
Qty: 180 | Refills: 3 | Status: ACTIVE | COMMUNITY
End: 2023-05-12

## 2022-09-23 RX ORDER — OCTISALATE, AVOBENZONE, HOMOSALATE, AND OCTOCRYLENE 29.4; 29.4; 49; 25.48 MG/ML; MG/ML; MG/ML; MG/ML
AS DIRECTED LOTION TOPICAL
Refills: 0 | Status: ACTIVE | COMMUNITY
Start: 1900-01-01

## 2022-09-23 RX ORDER — INFLIXIMAB 100 MG/10ML
INFUSE 5MG/KG INJECTION, POWDER, LYOPHILIZED, FOR SOLUTION INTRAVENOUS
Qty: 3 | Refills: 0 | Status: ACTIVE | COMMUNITY

## 2022-09-23 RX ORDER — LIDOCAINE 5% 5 G/100G
AS DIRECTED CREAM TOPICAL
Status: ACTIVE | COMMUNITY

## 2022-09-23 RX ORDER — INFLIXIMAB 100 MG/10ML
INFUSE 5MG/KG OVER NO LESS THAN 2 HOUR(S) BY INTRAVENOUS ROUTE INJECTION, POWDER, LYOPHILIZED, FOR SOLUTION INTRAVENOUS
Qty: 1 | Refills: 10 | Status: ACTIVE | COMMUNITY

## 2022-09-23 RX ORDER — NORTRIPTYLINE HYDROCHLORIDE 25 MG/1
AS DIRECTED CAPSULE ORAL ONCE A DAY
Refills: 0 | Status: ACTIVE | COMMUNITY

## 2022-09-23 RX ORDER — ESTRADIOL 0.1 MG/G
AS DIRECTED CREAM VAGINAL
Status: ACTIVE | COMMUNITY

## 2022-09-26 ENCOUNTER — OFFICE VISIT (OUTPATIENT)
Dept: URBAN - METROPOLITAN AREA CLINIC 97 | Facility: CLINIC | Age: 27
End: 2022-09-26

## 2022-09-28 ENCOUNTER — OFFICE VISIT (OUTPATIENT)
Dept: URBAN - METROPOLITAN AREA CLINIC 97 | Facility: CLINIC | Age: 27
End: 2022-09-28
Payer: COMMERCIAL

## 2022-09-28 VITALS
TEMPERATURE: 96.7 F | RESPIRATION RATE: 18 BRPM | HEART RATE: 86 BPM | BODY MASS INDEX: 21.66 KG/M2 | HEIGHT: 65 IN | WEIGHT: 130 LBS | DIASTOLIC BLOOD PRESSURE: 68 MMHG | SYSTOLIC BLOOD PRESSURE: 107 MMHG

## 2022-09-28 DIAGNOSIS — K51.90 ACUTE ULCERATIVE COLITIS: ICD-10-CM

## 2022-09-28 PROCEDURE — 96375 TX/PRO/DX INJ NEW DRUG ADDON: CPT | Performed by: INTERNAL MEDICINE

## 2022-09-28 PROCEDURE — 96415 CHEMO IV INFUSION ADDL HR: CPT | Performed by: INTERNAL MEDICINE

## 2022-09-28 PROCEDURE — 96413 CHEMO IV INFUSION 1 HR: CPT | Performed by: INTERNAL MEDICINE

## 2022-09-28 RX ORDER — MESALAMINE 1.2 G/1
BRAND NAME ONLY** 2 TABLETS TABLET, DELAYED RELEASE ORAL ONCE A DAY
Qty: 180 | Refills: 3 | Status: ACTIVE | COMMUNITY
End: 2023-05-12

## 2022-09-28 RX ORDER — LIDOCAINE 5% 5 G/100G
AS DIRECTED CREAM TOPICAL
Status: ACTIVE | COMMUNITY

## 2022-09-28 RX ORDER — NORTRIPTYLINE HYDROCHLORIDE 25 MG/1
AS DIRECTED CAPSULE ORAL ONCE A DAY
Refills: 0 | Status: ACTIVE | COMMUNITY

## 2022-09-28 RX ORDER — INFLIXIMAB 100 MG/10ML
INFUSE 5MG/KG INJECTION, POWDER, LYOPHILIZED, FOR SOLUTION INTRAVENOUS
Qty: 3 | Refills: 0 | Status: ACTIVE | COMMUNITY

## 2022-09-28 RX ORDER — OCTISALATE, AVOBENZONE, HOMOSALATE, AND OCTOCRYLENE 29.4; 29.4; 49; 25.48 MG/ML; MG/ML; MG/ML; MG/ML
AS DIRECTED LOTION TOPICAL
Refills: 0 | Status: ACTIVE | COMMUNITY
Start: 1900-01-01

## 2022-09-28 RX ORDER — ESTRADIOL 0.1 MG/G
AS DIRECTED CREAM VAGINAL
Status: ACTIVE | COMMUNITY

## 2022-09-28 RX ORDER — INFLIXIMAB 100 MG/10ML
INFUSE 5MG/KG OVER NO LESS THAN 2 HOUR(S) BY INTRAVENOUS ROUTE INJECTION, POWDER, LYOPHILIZED, FOR SOLUTION INTRAVENOUS
Qty: 1 | Refills: 10 | Status: ACTIVE | COMMUNITY

## 2022-09-30 ENCOUNTER — OFFICE VISIT (OUTPATIENT)
Dept: URBAN - METROPOLITAN AREA CLINIC 114 | Facility: CLINIC | Age: 27
End: 2022-09-30
Payer: COMMERCIAL

## 2022-09-30 ENCOUNTER — TELEPHONE ENCOUNTER (OUTPATIENT)
Dept: URBAN - METROPOLITAN AREA CLINIC 92 | Facility: CLINIC | Age: 27
End: 2022-09-30

## 2022-09-30 DIAGNOSIS — R10.2 PELVIC PAIN: ICD-10-CM

## 2022-09-30 PROCEDURE — 83013 H PYLORI (C-13) BREATH: CPT | Performed by: INTERNAL MEDICINE

## 2022-11-07 ENCOUNTER — TELEPHONE ENCOUNTER (OUTPATIENT)
Dept: URBAN - METROPOLITAN AREA CLINIC 78 | Facility: CLINIC | Age: 27
End: 2022-11-07

## 2022-11-22 ENCOUNTER — OFFICE VISIT (OUTPATIENT)
Dept: URBAN - METROPOLITAN AREA CLINIC 97 | Facility: CLINIC | Age: 27
End: 2022-11-22
Payer: COMMERCIAL

## 2022-11-22 VITALS
HEART RATE: 66 BPM | BODY MASS INDEX: 20.33 KG/M2 | HEIGHT: 65 IN | RESPIRATION RATE: 17 BRPM | WEIGHT: 122 LBS | SYSTOLIC BLOOD PRESSURE: 103 MMHG | DIASTOLIC BLOOD PRESSURE: 69 MMHG | TEMPERATURE: 96 F

## 2022-11-22 DIAGNOSIS — K51.80 CHRONIC PANCOLONIC ULCERATIVE COLITIS: ICD-10-CM

## 2022-11-22 PROCEDURE — 96413 CHEMO IV INFUSION 1 HR: CPT | Performed by: INTERNAL MEDICINE

## 2022-11-22 PROCEDURE — 96415 CHEMO IV INFUSION ADDL HR: CPT | Performed by: INTERNAL MEDICINE

## 2022-11-22 PROCEDURE — 96375 TX/PRO/DX INJ NEW DRUG ADDON: CPT | Performed by: INTERNAL MEDICINE

## 2022-11-22 RX ORDER — INFLIXIMAB 100 MG/10ML
INFUSE 5MG/KG OVER NO LESS THAN 2 HOUR(S) BY INTRAVENOUS ROUTE INJECTION, POWDER, LYOPHILIZED, FOR SOLUTION INTRAVENOUS
Qty: 1 | Refills: 10 | Status: ACTIVE | COMMUNITY

## 2022-11-22 RX ORDER — ESTRADIOL 0.1 MG/G
AS DIRECTED CREAM VAGINAL
Status: ACTIVE | COMMUNITY

## 2022-11-22 RX ORDER — OCTISALATE, AVOBENZONE, HOMOSALATE, AND OCTOCRYLENE 29.4; 29.4; 49; 25.48 MG/ML; MG/ML; MG/ML; MG/ML
AS DIRECTED LOTION TOPICAL
Refills: 0 | Status: ACTIVE | COMMUNITY
Start: 1900-01-01

## 2022-11-22 RX ORDER — INFLIXIMAB 100 MG/10ML
INFUSE 5MG/KG INJECTION, POWDER, LYOPHILIZED, FOR SOLUTION INTRAVENOUS
Qty: 3 | Refills: 0 | Status: ACTIVE | COMMUNITY

## 2022-11-22 RX ORDER — LIDOCAINE 5% 5 G/100G
AS DIRECTED CREAM TOPICAL
Status: ACTIVE | COMMUNITY

## 2022-11-22 RX ORDER — MESALAMINE 1.2 G/1
BRAND NAME ONLY** 2 TABLETS TABLET, DELAYED RELEASE ORAL ONCE A DAY
Qty: 180 | Refills: 3 | Status: ACTIVE | COMMUNITY
End: 2023-05-12

## 2022-11-22 RX ORDER — NORTRIPTYLINE HYDROCHLORIDE 25 MG/1
AS DIRECTED CAPSULE ORAL ONCE A DAY
Refills: 0 | Status: ACTIVE | COMMUNITY

## 2022-11-23 ENCOUNTER — OFFICE VISIT (OUTPATIENT)
Dept: URBAN - METROPOLITAN AREA CLINIC 97 | Facility: CLINIC | Age: 27
End: 2022-11-23

## 2022-11-30 ENCOUNTER — TELEPHONE ENCOUNTER (OUTPATIENT)
Dept: URBAN - METROPOLITAN AREA CLINIC 78 | Facility: CLINIC | Age: 27
End: 2022-11-30

## 2022-12-16 ENCOUNTER — OFFICE VISIT (OUTPATIENT)
Dept: URBAN - METROPOLITAN AREA CLINIC 78 | Facility: CLINIC | Age: 27
End: 2022-12-16
Payer: COMMERCIAL

## 2022-12-16 VITALS
TEMPERATURE: 98.1 F | HEART RATE: 64 BPM | SYSTOLIC BLOOD PRESSURE: 102 MMHG | WEIGHT: 119.6 LBS | BODY MASS INDEX: 19.93 KG/M2 | RESPIRATION RATE: 16 BRPM | HEIGHT: 65 IN | DIASTOLIC BLOOD PRESSURE: 71 MMHG

## 2022-12-16 DIAGNOSIS — E55.9 VITAMIN D DEFICIENCY: ICD-10-CM

## 2022-12-16 DIAGNOSIS — E53.8 VITAMIN B12 DEFICIENCY: ICD-10-CM

## 2022-12-16 DIAGNOSIS — G89.29 OTHER CHRONIC PAIN: ICD-10-CM

## 2022-12-16 DIAGNOSIS — K51.90 ULCERATIVE COLITIS: ICD-10-CM

## 2022-12-16 DIAGNOSIS — R10.32 LLQ PAIN: ICD-10-CM

## 2022-12-16 PROCEDURE — 99214 OFFICE O/P EST MOD 30 MIN: CPT | Performed by: INTERNAL MEDICINE

## 2022-12-16 RX ORDER — INFLIXIMAB 100 MG/10ML
INFUSE 5MG/KG INJECTION, POWDER, LYOPHILIZED, FOR SOLUTION INTRAVENOUS
Qty: 3 | Refills: 0 | Status: ACTIVE | COMMUNITY

## 2022-12-16 RX ORDER — NORTRIPTYLINE HYDROCHLORIDE 25 MG/1
AS DIRECTED CAPSULE ORAL ONCE A DAY
Refills: 0 | Status: ACTIVE | COMMUNITY

## 2022-12-16 RX ORDER — LIDOCAINE 5% 5 G/100G
AS DIRECTED CREAM TOPICAL
Status: ACTIVE | COMMUNITY

## 2022-12-16 RX ORDER — MESALAMINE 1.2 G/1
BRAND NAME ONLY** 2 TABLETS TABLET, DELAYED RELEASE ORAL ONCE A DAY
Qty: 180 | Refills: 3 | Status: ACTIVE | COMMUNITY
End: 2023-05-12

## 2022-12-16 RX ORDER — OCTISALATE, AVOBENZONE, HOMOSALATE, AND OCTOCRYLENE 29.4; 29.4; 49; 25.48 MG/ML; MG/ML; MG/ML; MG/ML
AS DIRECTED LOTION TOPICAL
Refills: 0 | Status: ACTIVE | COMMUNITY
Start: 1900-01-01

## 2022-12-16 RX ORDER — INFLIXIMAB 100 MG/10ML
INFUSE 5MG/KG OVER NO LESS THAN 2 HOUR(S) BY INTRAVENOUS ROUTE INJECTION, POWDER, LYOPHILIZED, FOR SOLUTION INTRAVENOUS
Qty: 1 | Refills: 10 | Status: ACTIVE | COMMUNITY

## 2022-12-16 RX ORDER — ESTRADIOL 0.1 MG/G
AS DIRECTED CREAM VAGINAL
Status: ACTIVE | COMMUNITY

## 2022-12-16 NOTE — HPI-TODAY'S VISIT:
Patient is using suppository  for anal pain /fissure  Patient is taking Magnesium 800-1000 mg for constipation  She has been diagnosed with mild Diabetes Insipidius with plans for starting therapy after her trip  Personal hx of Interstitial cystitis s/p hydrodistention x 6  Dx of vaginal Lichens has beed discontinued , she has been treated with Amytriptilline 20 mg daily  After last Remicade no further UC symptoms  Travelling to [facility]   Labs December 9, 2022: Albumin 4.4 Total testosterone 14 Glucose 94 BUN 9 creatinine 0.54 Sodium 139/potassium 4.8 chloride 106 bicarb 24 calcium 9.6 total protein 6.6 albumin is 4.5 total bili 0.5 alkaline phosphatase 47 AST 4013 ALT 9 vitamin D 61 Total iron 91 TIBC 340 ferritin 25 WBC 5.3 hemoglobin 12.7 hematocrit 37.3 platelets 275 TSH 0.60 and  Patient states that she has seen Derm and  Infectious disease    ID Dr Cid  Hs undergone Pudendal nerve block  Valtrex has been d/c  Interstital cystitis : s/p ihydrodistention  Anal pain /fissure sp evaluation by Dr Holguin : Prn NGT/steroid based suppository  Patient states that she is seeing pelvic floor therapy  She states that she has been recommended pudendal nerve block      PREVIOUS ENCOUNTER:     Patient is seen in follow up  after her Remicade infusion 4/18/2022  ( she was predmedicate with Benedryl and Cortisoone )  Patient is experiencing symptoms : low grade Tmax 100 same day of infusion and cold sore  She took Valtrex on on saturday which helped with cold sore ( Rx by Dr Trinidad)   (-)  Denies swelling of lips or tongue or mouth  (-) breathing problems   On the bright side her ankle pain is resolved  Left lower quadrant tenderness is improved  Denies diarrhea  Does have fissure and is seeing Dr Blu Juarez  Going to PT 2 x week   Patient states that Nortryptilline has made a huge difference in shooting pain /weird pain    Patient got first and second dose of Inflectra ( cold sores )   Jan 5/22 : second dose  Patient states that she had a growth in vaginal area  Jan 20  s/p Biopsy low grade precancerous cells ..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   Remicade denied y Insurance company  Stelara she has adverse effects  Inflectra initiated : Patient appears to have a growth requiring intervention.    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   [facility] : BM q 3-4 /day , urgency  No rectal bleeding    Patient reports pain in left side   Patient has Lialda at home   Reicade in Jan and last in May ( even while )  Patient reports getting Stelara infusion  She tolerated Stelara  infusion well  Patient states that she is fine except  cold sore and drop in sexual drive  She has seen other people report on patient support group  This particular side effect : low libido despite being in clinical remission is not acceptible  She is not on Fluxetine ( she discontinued after 3 days  it because it did not help )  She states that Concerta did not effect he libido in past  She also states that Remicade did not effect it either   Overall she feels well and denies anxiety or depression   Patient states that right after Stelara  she did have hemorrhoid which had since resolved .  [facility] : BM q 1 day No rectal bleeding  No pain  No urgency or tenesmus  She exercises regularly an diet is ok  Weight is ok  Last labs were with PCP in Dec 2020  Vit D  59  Vit B 12 :299    CBC Hg 14.0 Hct 42.9 Platelet 311 CMP Normal      Last colonoscopy 10/20 : Patchy inactive colitis in left side of colon and rectum ( random bx from right and miiddle part of colon reveal histological remission ) Hx of shingles post surgery ( Rhinoplasty )  , resolved  Has mild Raynauds  Has d/c Fluoxetine  Has d/c meds for ADHD  Patient saw Blu PATEL on Dec 20 2019  She has a dx with ADHD and anxiety Denies Etoh use Last Derm exam: sees one regularly .... Last PAP smear last year  ( Zofia Ramirez ) : She is sexually active and admits to using precautions . Currently nt on OCP Summarizing : Dx with pancolitis in 2010 (pt known to me )and then she transferred to [place] 2015 and have relocating back .

## 2022-12-29 PROBLEM — 64766004 ULCERATIVE COLITIS: Status: ACTIVE | Noted: 2021-03-11

## 2022-12-29 PROBLEM — 82423001: Status: ACTIVE | Noted: 2022-02-02

## 2022-12-29 PROBLEM — 34713006: Status: ACTIVE | Noted: 2021-04-30

## 2023-01-23 ENCOUNTER — OFFICE VISIT (OUTPATIENT)
Dept: URBAN - METROPOLITAN AREA CLINIC 97 | Facility: CLINIC | Age: 28
End: 2023-01-23
Payer: COMMERCIAL

## 2023-01-23 ENCOUNTER — TELEPHONE ENCOUNTER (OUTPATIENT)
Dept: URBAN - METROPOLITAN AREA CLINIC 97 | Facility: CLINIC | Age: 28
End: 2023-01-23

## 2023-01-23 VITALS
BODY MASS INDEX: 21.16 KG/M2 | WEIGHT: 127 LBS | HEIGHT: 65 IN | RESPIRATION RATE: 20 BRPM | TEMPERATURE: 98.1 F | SYSTOLIC BLOOD PRESSURE: 137 MMHG | DIASTOLIC BLOOD PRESSURE: 77 MMHG | HEART RATE: 71 BPM

## 2023-01-23 DIAGNOSIS — K51.80 CHRONIC PANCOLONIC ULCERATIVE COLITIS: ICD-10-CM

## 2023-01-23 PROBLEM — 444548001: Status: ACTIVE | Noted: 2021-09-29

## 2023-01-23 PROCEDURE — 96413 CHEMO IV INFUSION 1 HR: CPT | Performed by: INTERNAL MEDICINE

## 2023-01-23 PROCEDURE — 96375 TX/PRO/DX INJ NEW DRUG ADDON: CPT | Performed by: INTERNAL MEDICINE

## 2023-01-23 PROCEDURE — 96415 CHEMO IV INFUSION ADDL HR: CPT | Performed by: INTERNAL MEDICINE

## 2023-01-23 RX ORDER — MESALAMINE 1.2 G/1
BRAND NAME ONLY** 2 TABLETS TABLET, DELAYED RELEASE ORAL ONCE A DAY
Qty: 180 | Refills: 3 | Status: ACTIVE | COMMUNITY
End: 2023-05-12

## 2023-01-23 RX ORDER — INFLIXIMAB 100 MG/10ML
INFUSE 5MG/KG OVER NO LESS THAN 2 HOUR(S) BY INTRAVENOUS ROUTE INJECTION, POWDER, LYOPHILIZED, FOR SOLUTION INTRAVENOUS
Qty: 1 | Refills: 10 | Status: ACTIVE | COMMUNITY

## 2023-01-23 RX ORDER — NORTRIPTYLINE HYDROCHLORIDE 25 MG/1
AS DIRECTED CAPSULE ORAL ONCE A DAY
Refills: 0 | Status: ACTIVE | COMMUNITY

## 2023-01-23 RX ORDER — ESTRADIOL 0.1 MG/G
AS DIRECTED CREAM VAGINAL
Status: ACTIVE | COMMUNITY

## 2023-01-23 RX ORDER — INFLIXIMAB 100 MG/10ML
INFUSE 5MG/KG INJECTION, POWDER, LYOPHILIZED, FOR SOLUTION INTRAVENOUS
Qty: 3 | Refills: 0 | Status: ACTIVE | COMMUNITY

## 2023-01-23 RX ORDER — OCTISALATE, AVOBENZONE, HOMOSALATE, AND OCTOCRYLENE 29.4; 29.4; 49; 25.48 MG/ML; MG/ML; MG/ML; MG/ML
AS DIRECTED LOTION TOPICAL
Refills: 0 | Status: ACTIVE | COMMUNITY
Start: 1900-01-01

## 2023-01-23 RX ORDER — LIDOCAINE 5% 5 G/100G
AS DIRECTED CREAM TOPICAL
Status: ACTIVE | COMMUNITY

## 2023-03-20 ENCOUNTER — OFFICE VISIT (OUTPATIENT)
Dept: URBAN - METROPOLITAN AREA CLINIC 97 | Facility: CLINIC | Age: 28
End: 2023-03-20

## 2023-03-20 ENCOUNTER — LAB OUTSIDE AN ENCOUNTER (OUTPATIENT)
Dept: URBAN - METROPOLITAN AREA CLINIC 78 | Facility: CLINIC | Age: 28
End: 2023-03-20

## 2023-03-24 LAB
QUANTIFERON CRITERIA: (no result)
QUANTIFERON INCUBATION: (no result)
QUANTIFERON MITOGEN VALUE: >10
QUANTIFERON NIL VALUE: 0.04
QUANTIFERON TB1 AG VALUE: 0.11
QUANTIFERON TB2 AG VALUE: 0.11
QUANTIFERON-TB GOLD PLUS: NEGATIVE

## 2023-03-27 ENCOUNTER — OFFICE VISIT (OUTPATIENT)
Dept: URBAN - METROPOLITAN AREA CLINIC 97 | Facility: CLINIC | Age: 28
End: 2023-03-27
Payer: COMMERCIAL

## 2023-03-27 ENCOUNTER — TELEPHONE ENCOUNTER (OUTPATIENT)
Dept: URBAN - METROPOLITAN AREA CLINIC 97 | Facility: CLINIC | Age: 28
End: 2023-03-27

## 2023-03-27 VITALS
TEMPERATURE: 97.5 F | HEIGHT: 65 IN | DIASTOLIC BLOOD PRESSURE: 75 MMHG | RESPIRATION RATE: 18 BRPM | SYSTOLIC BLOOD PRESSURE: 97 MMHG | BODY MASS INDEX: 20.99 KG/M2 | HEART RATE: 72 BPM | WEIGHT: 126 LBS

## 2023-03-27 DIAGNOSIS — K51.80 CHRONIC PANCOLONIC ULCERATIVE COLITIS: ICD-10-CM

## 2023-03-27 PROCEDURE — 96413 CHEMO IV INFUSION 1 HR: CPT | Performed by: INTERNAL MEDICINE

## 2023-03-27 PROCEDURE — 96415 CHEMO IV INFUSION ADDL HR: CPT | Performed by: INTERNAL MEDICINE

## 2023-03-27 PROCEDURE — 96375 TX/PRO/DX INJ NEW DRUG ADDON: CPT | Performed by: INTERNAL MEDICINE

## 2023-03-27 RX ORDER — ESTRADIOL 0.1 MG/G
AS DIRECTED CREAM VAGINAL
Status: ACTIVE | COMMUNITY

## 2023-03-27 RX ORDER — CETIRIZINE HYDROCHLORIDE 10 MG/1
1 TABLET TABLET, FILM COATED ORAL ONCE A DAY
Qty: 1 TABLET | OUTPATIENT

## 2023-03-27 RX ORDER — LIDOCAINE 5% 5 G/100G
AS DIRECTED CREAM TOPICAL
Status: ACTIVE | COMMUNITY

## 2023-03-27 RX ORDER — INFLIXIMAB 100 MG/10ML
INFUSE 5MG/KG INJECTION, POWDER, LYOPHILIZED, FOR SOLUTION INTRAVENOUS
Qty: 3 | Refills: 0 | Status: ACTIVE | COMMUNITY

## 2023-03-27 RX ORDER — HYDROCORTISONE SODIUM SUCCINATE 100 MG/2ML
AS DIRECTED INJECTION, POWDER, FOR SOLUTION INTRAMUSCULAR; INTRAVENOUS
Qty: 10 | Refills: 3 | OUTPATIENT

## 2023-03-27 RX ORDER — INFLIXIMAB 100 MG/10ML
INFUSE 5 MG/KG OVER NO LESS THAN 2 HOUR(S) BY INTRAVENOUS ROUTE INJECTION, POWDER, LYOPHILIZED, FOR SOLUTION INTRAVENOUS
Qty: 10 | Refills: 3

## 2023-03-27 RX ORDER — NORTRIPTYLINE HYDROCHLORIDE 25 MG/1
AS DIRECTED CAPSULE ORAL ONCE A DAY
Refills: 0 | Status: ACTIVE | COMMUNITY

## 2023-03-27 RX ORDER — MESALAMINE 1.2 G/1
BRAND NAME ONLY** 2 TABLETS TABLET, DELAYED RELEASE ORAL ONCE A DAY
Qty: 180 | Refills: 3 | Status: ACTIVE | COMMUNITY
End: 2023-05-12

## 2023-03-27 RX ORDER — OCTISALATE, AVOBENZONE, HOMOSALATE, AND OCTOCRYLENE 29.4; 29.4; 49; 25.48 MG/ML; MG/ML; MG/ML; MG/ML
AS DIRECTED LOTION TOPICAL
Refills: 0 | Status: ACTIVE | COMMUNITY
Start: 1900-01-01

## 2023-03-27 RX ORDER — INFLIXIMAB 100 MG/10ML
INFUSE 5MG/KG OVER NO LESS THAN 2 HOUR(S) BY INTRAVENOUS ROUTE INJECTION, POWDER, LYOPHILIZED, FOR SOLUTION INTRAVENOUS
Qty: 1 | Refills: 10 | Status: ACTIVE | COMMUNITY

## 2023-05-22 ENCOUNTER — OFFICE VISIT (OUTPATIENT)
Dept: URBAN - METROPOLITAN AREA CLINIC 97 | Facility: CLINIC | Age: 28
End: 2023-05-22

## 2023-05-22 ENCOUNTER — TELEPHONE ENCOUNTER (OUTPATIENT)
Dept: URBAN - METROPOLITAN AREA CLINIC 97 | Facility: CLINIC | Age: 28
End: 2023-05-22

## 2023-05-22 RX ORDER — INFLIXIMAB 100 MG/10ML
RECONSTITUTE WITH NS AND INFUSE 500MG INTRAVENOUSLY OVER AT LEAST 2 HOURS, ONCE EVERY 6 WEEKS. REFRIGERATE INJECTION, POWDER, LYOPHILIZED, FOR SOLUTION INTRAVENOUS
Qty: 4 | Refills: 0 | OUTPATIENT

## 2023-05-22 RX ORDER — CETIRIZINE HYDROCHLORIDE 10 MG/1
1 TABLET TABLET, FILM COATED ORAL ONCE A DAY
Qty: 1 TABLET | Status: ACTIVE | COMMUNITY

## 2023-05-22 RX ORDER — INFLIXIMAB 100 MG/10ML
INFUSE 5MG/KG OVER NO LESS THAN 2 HOUR(S) BY INTRAVENOUS ROUTE INJECTION, POWDER, LYOPHILIZED, FOR SOLUTION INTRAVENOUS
Qty: 1 | Refills: 10 | Status: ACTIVE | COMMUNITY

## 2023-05-22 RX ORDER — OCTISALATE, AVOBENZONE, HOMOSALATE, AND OCTOCRYLENE 29.4; 29.4; 49; 25.48 MG/ML; MG/ML; MG/ML; MG/ML
AS DIRECTED LOTION TOPICAL
Refills: 0 | Status: ACTIVE | COMMUNITY
Start: 1900-01-01

## 2023-05-22 RX ORDER — PREDNISONE 10 MG/1
2 TABLETS TABLET ORAL ONCE A DAY
Qty: 8 TABLET | Refills: 0 | OUTPATIENT

## 2023-05-22 RX ORDER — INFLIXIMAB 100 MG/10ML
INFUSE 5 MG/KG OVER NO LESS THAN 2 HOUR(S) BY INTRAVENOUS ROUTE INJECTION, POWDER, LYOPHILIZED, FOR SOLUTION INTRAVENOUS
Qty: 10 | Refills: 3 | Status: ACTIVE | COMMUNITY

## 2023-05-22 RX ORDER — ESTRADIOL 0.1 MG/G
AS DIRECTED CREAM VAGINAL
Status: ACTIVE | COMMUNITY

## 2023-05-22 RX ORDER — LIDOCAINE 5% 5 G/100G
AS DIRECTED CREAM TOPICAL
Status: ACTIVE | COMMUNITY

## 2023-05-22 RX ORDER — NORTRIPTYLINE HYDROCHLORIDE 25 MG/1
AS DIRECTED CAPSULE ORAL ONCE A DAY
Refills: 0 | Status: ACTIVE | COMMUNITY

## 2023-05-22 RX ORDER — HYDROCORTISONE SODIUM SUCCINATE 100 MG/2ML
AS DIRECTED INJECTION, POWDER, FOR SOLUTION INTRAMUSCULAR; INTRAVENOUS
Qty: 10 | Refills: 3 | Status: ACTIVE | COMMUNITY

## 2023-05-25 ENCOUNTER — TELEPHONE ENCOUNTER (OUTPATIENT)
Dept: URBAN - METROPOLITAN AREA CLINIC 97 | Facility: CLINIC | Age: 28
End: 2023-05-25

## 2023-05-31 ENCOUNTER — TELEPHONE ENCOUNTER (OUTPATIENT)
Dept: URBAN - METROPOLITAN AREA CLINIC 6 | Facility: CLINIC | Age: 28
End: 2023-05-31

## 2023-06-01 ENCOUNTER — OFFICE VISIT (OUTPATIENT)
Dept: URBAN - METROPOLITAN AREA CLINIC 97 | Facility: CLINIC | Age: 28
End: 2023-06-01
Payer: COMMERCIAL

## 2023-06-01 VITALS
HEIGHT: 65 IN | SYSTOLIC BLOOD PRESSURE: 100 MMHG | BODY MASS INDEX: 21.33 KG/M2 | WEIGHT: 128 LBS | HEART RATE: 74 BPM | DIASTOLIC BLOOD PRESSURE: 71 MMHG | RESPIRATION RATE: 16 BRPM | TEMPERATURE: 97.5 F

## 2023-06-01 DIAGNOSIS — K51.80 OTHER ULCERATIVE COLITIS WITHOUT COMPLICATIONS: ICD-10-CM

## 2023-06-01 PROCEDURE — 96375 TX/PRO/DX INJ NEW DRUG ADDON: CPT | Performed by: INTERNAL MEDICINE

## 2023-06-01 PROCEDURE — 96415 CHEMO IV INFUSION ADDL HR: CPT | Performed by: INTERNAL MEDICINE

## 2023-06-01 PROCEDURE — 96413 CHEMO IV INFUSION 1 HR: CPT | Performed by: INTERNAL MEDICINE

## 2023-06-01 RX ORDER — INFLIXIMAB 100 MG/10ML
INFUSE 5MG/KG OVER NO LESS THAN 2 HOUR(S) BY INTRAVENOUS ROUTE INJECTION, POWDER, LYOPHILIZED, FOR SOLUTION INTRAVENOUS
Qty: 1 | Refills: 10 | Status: ACTIVE | COMMUNITY

## 2023-06-01 RX ORDER — OCTISALATE, AVOBENZONE, HOMOSALATE, AND OCTOCRYLENE 29.4; 29.4; 49; 25.48 MG/ML; MG/ML; MG/ML; MG/ML
AS DIRECTED LOTION TOPICAL
Refills: 0 | Status: ACTIVE | COMMUNITY
Start: 1900-01-01

## 2023-06-01 RX ORDER — PREDNISONE 10 MG/1
2 TABLETS TABLET ORAL ONCE A DAY
Qty: 8 TABLET | Refills: 0 | Status: ACTIVE | COMMUNITY

## 2023-06-01 RX ORDER — INFLIXIMAB 100 MG/10ML
RECONSTITUTE WITH NS AND INFUSE 500MG INTRAVENOUSLY OVER AT LEAST 2 HOURS, ONCE EVERY 6 WEEKS. REFRIGERATE INJECTION, POWDER, LYOPHILIZED, FOR SOLUTION INTRAVENOUS
Qty: 4 | Refills: 0 | Status: ACTIVE | COMMUNITY

## 2023-06-01 RX ORDER — CETIRIZINE HYDROCHLORIDE 10 MG/1
1 TABLET TABLET, FILM COATED ORAL ONCE A DAY
Qty: 1 TABLET | Status: ACTIVE | COMMUNITY

## 2023-06-01 RX ORDER — NORTRIPTYLINE HYDROCHLORIDE 25 MG/1
AS DIRECTED CAPSULE ORAL ONCE A DAY
Refills: 0 | Status: ACTIVE | COMMUNITY

## 2023-06-01 RX ORDER — INFLIXIMAB 100 MG/10ML
INFUSE 5 MG/KG OVER NO LESS THAN 2 HOUR(S) BY INTRAVENOUS ROUTE INJECTION, POWDER, LYOPHILIZED, FOR SOLUTION INTRAVENOUS
Qty: 10 | Refills: 3 | Status: ACTIVE | COMMUNITY

## 2023-06-01 RX ORDER — ESTRADIOL 0.1 MG/G
AS DIRECTED CREAM VAGINAL
Status: ACTIVE | COMMUNITY

## 2023-06-01 RX ORDER — HYDROCORTISONE SODIUM SUCCINATE 100 MG/2ML
AS DIRECTED INJECTION, POWDER, FOR SOLUTION INTRAMUSCULAR; INTRAVENOUS
Qty: 10 | Refills: 3 | Status: ACTIVE | COMMUNITY

## 2023-06-01 RX ORDER — LIDOCAINE 5% 5 G/100G
AS DIRECTED CREAM TOPICAL
Status: ACTIVE | COMMUNITY

## 2023-06-09 ENCOUNTER — OFFICE VISIT (OUTPATIENT)
Dept: URBAN - METROPOLITAN AREA CLINIC 77 | Facility: CLINIC | Age: 28
End: 2023-06-09
Payer: COMMERCIAL

## 2023-06-09 DIAGNOSIS — E55.9 VITAMIN D DEFICIENCY: ICD-10-CM

## 2023-06-09 DIAGNOSIS — E53.8 VITAMIN B12 DEFICIENCY: ICD-10-CM

## 2023-06-09 PROCEDURE — 96372 THER/PROPH/DIAG INJ SC/IM: CPT | Performed by: INTERNAL MEDICINE

## 2023-06-09 RX ORDER — CETIRIZINE HYDROCHLORIDE 10 MG/1
1 TABLET TABLET, FILM COATED ORAL ONCE A DAY
Qty: 1 TABLET | Status: ACTIVE | COMMUNITY

## 2023-06-09 RX ORDER — INFLIXIMAB 100 MG/10ML
RECONSTITUTE WITH NS AND INFUSE 500MG INTRAVENOUSLY OVER AT LEAST 2 HOURS, ONCE EVERY 6 WEEKS. REFRIGERATE INJECTION, POWDER, LYOPHILIZED, FOR SOLUTION INTRAVENOUS
Qty: 4 | Refills: 0 | Status: ACTIVE | COMMUNITY

## 2023-06-09 RX ORDER — OCTISALATE, AVOBENZONE, HOMOSALATE, AND OCTOCRYLENE 29.4; 29.4; 49; 25.48 MG/ML; MG/ML; MG/ML; MG/ML
AS DIRECTED LOTION TOPICAL
Refills: 0 | Status: ACTIVE | COMMUNITY
Start: 1900-01-01

## 2023-06-09 RX ORDER — PREDNISONE 10 MG/1
2 TABLETS TABLET ORAL ONCE A DAY
Qty: 8 TABLET | Refills: 0 | Status: ACTIVE | COMMUNITY

## 2023-06-09 RX ORDER — ESTRADIOL 0.1 MG/G
AS DIRECTED CREAM VAGINAL
Status: ACTIVE | COMMUNITY

## 2023-06-09 RX ORDER — LIDOCAINE 5% 5 G/100G
AS DIRECTED CREAM TOPICAL
Status: ACTIVE | COMMUNITY

## 2023-06-09 RX ORDER — HYDROCORTISONE SODIUM SUCCINATE 100 MG/2ML
AS DIRECTED INJECTION, POWDER, FOR SOLUTION INTRAMUSCULAR; INTRAVENOUS
Qty: 10 | Refills: 3 | Status: ACTIVE | COMMUNITY

## 2023-06-09 RX ORDER — NORTRIPTYLINE HYDROCHLORIDE 25 MG/1
AS DIRECTED CAPSULE ORAL ONCE A DAY
Refills: 0 | Status: ACTIVE | COMMUNITY

## 2023-06-09 RX ORDER — INFLIXIMAB 100 MG/10ML
INFUSE 5MG/KG OVER NO LESS THAN 2 HOUR(S) BY INTRAVENOUS ROUTE INJECTION, POWDER, LYOPHILIZED, FOR SOLUTION INTRAVENOUS
Qty: 1 | Refills: 10 | Status: ACTIVE | COMMUNITY

## 2023-06-09 RX ORDER — INFLIXIMAB 100 MG/10ML
INFUSE 5 MG/KG OVER NO LESS THAN 2 HOUR(S) BY INTRAVENOUS ROUTE INJECTION, POWDER, LYOPHILIZED, FOR SOLUTION INTRAVENOUS
Qty: 10 | Refills: 3 | Status: ACTIVE | COMMUNITY

## 2023-07-07 ENCOUNTER — OFFICE VISIT (OUTPATIENT)
Dept: URBAN - METROPOLITAN AREA CLINIC 77 | Facility: CLINIC | Age: 28
End: 2023-07-07
Payer: COMMERCIAL

## 2023-07-07 DIAGNOSIS — E53.8 VITAMIN B12 DEFICIENCY: ICD-10-CM

## 2023-07-07 DIAGNOSIS — E56.8 DEFICIENCY OF OTHER VITAMINS: ICD-10-CM

## 2023-07-07 PROCEDURE — 96372 THER/PROPH/DIAG INJ SC/IM: CPT | Performed by: INTERNAL MEDICINE

## 2023-07-10 ENCOUNTER — OFFICE VISIT (OUTPATIENT)
Dept: URBAN - METROPOLITAN AREA CLINIC 77 | Facility: CLINIC | Age: 28
End: 2023-07-10

## 2023-07-17 ENCOUNTER — OFFICE VISIT (OUTPATIENT)
Dept: URBAN - METROPOLITAN AREA CLINIC 97 | Facility: CLINIC | Age: 28
End: 2023-07-17

## 2023-07-24 ENCOUNTER — OFFICE VISIT (OUTPATIENT)
Dept: URBAN - METROPOLITAN AREA CLINIC 77 | Facility: CLINIC | Age: 28
End: 2023-07-24
Payer: COMMERCIAL

## 2023-07-24 VITALS
BODY MASS INDEX: 21.99 KG/M2 | TEMPERATURE: 98.6 F | HEIGHT: 65 IN | WEIGHT: 132 LBS | DIASTOLIC BLOOD PRESSURE: 93 MMHG | SYSTOLIC BLOOD PRESSURE: 121 MMHG

## 2023-07-24 DIAGNOSIS — K51.80 CHRONIC PANCOLONIC ULCERATIVE COLITIS: ICD-10-CM

## 2023-07-24 PROCEDURE — 96413 CHEMO IV INFUSION 1 HR: CPT | Performed by: INTERNAL MEDICINE

## 2023-07-24 PROCEDURE — 96415 CHEMO IV INFUSION ADDL HR: CPT | Performed by: INTERNAL MEDICINE

## 2023-07-24 PROCEDURE — 96375 TX/PRO/DX INJ NEW DRUG ADDON: CPT | Performed by: INTERNAL MEDICINE

## 2023-07-24 RX ORDER — HYDROCORTISONE SODIUM SUCCINATE 100 MG/2ML
AS DIRECTED INJECTION, POWDER, FOR SOLUTION INTRAMUSCULAR; INTRAVENOUS
Qty: 10 | Refills: 3 | Status: ACTIVE | COMMUNITY

## 2023-07-24 RX ORDER — CETIRIZINE HYDROCHLORIDE 10 MG/1
1 TABLET TABLET, FILM COATED ORAL ONCE A DAY
Qty: 1 TABLET | Status: ACTIVE | COMMUNITY

## 2023-07-24 RX ORDER — PREDNISONE 10 MG/1
2 TABLETS TABLET ORAL ONCE A DAY
Qty: 8 TABLET | Refills: 0 | Status: ACTIVE | COMMUNITY

## 2023-07-24 RX ORDER — LIDOCAINE 5% 5 G/100G
AS DIRECTED CREAM TOPICAL
Status: ACTIVE | COMMUNITY

## 2023-07-24 RX ORDER — OCTISALATE, AVOBENZONE, HOMOSALATE, AND OCTOCRYLENE 29.4; 29.4; 49; 25.48 MG/ML; MG/ML; MG/ML; MG/ML
AS DIRECTED LOTION TOPICAL
Refills: 0 | Status: ACTIVE | COMMUNITY
Start: 1900-01-01

## 2023-07-24 RX ORDER — INFLIXIMAB 100 MG/10ML
INFUSE 5MG/KG OVER NO LESS THAN 2 HOUR(S) BY INTRAVENOUS ROUTE INJECTION, POWDER, LYOPHILIZED, FOR SOLUTION INTRAVENOUS
Qty: 1 | Refills: 10 | Status: ACTIVE | COMMUNITY

## 2023-07-24 RX ORDER — ESTRADIOL 0.1 MG/G
AS DIRECTED CREAM VAGINAL
Status: ACTIVE | COMMUNITY

## 2023-07-24 RX ORDER — INFLIXIMAB 100 MG/10ML
RECONSTITUTE WITH NS AND INFUSE 500MG INTRAVENOUSLY OVER AT LEAST 2 HOURS, ONCE EVERY 6 WEEKS. REFRIGERATE INJECTION, POWDER, LYOPHILIZED, FOR SOLUTION INTRAVENOUS
Qty: 4 | Refills: 0 | Status: ACTIVE | COMMUNITY

## 2023-07-24 RX ORDER — NORTRIPTYLINE HYDROCHLORIDE 25 MG/1
AS DIRECTED CAPSULE ORAL ONCE A DAY
Refills: 0 | Status: ACTIVE | COMMUNITY

## 2023-07-24 RX ORDER — INFLIXIMAB 100 MG/10ML
INFUSE 5 MG/KG OVER NO LESS THAN 2 HOUR(S) BY INTRAVENOUS ROUTE INJECTION, POWDER, LYOPHILIZED, FOR SOLUTION INTRAVENOUS
Qty: 10 | Refills: 3 | Status: ACTIVE | COMMUNITY

## 2023-08-09 ENCOUNTER — LAB OUTSIDE AN ENCOUNTER (OUTPATIENT)
Dept: URBAN - METROPOLITAN AREA CLINIC 78 | Facility: CLINIC | Age: 28
End: 2023-08-09

## 2023-08-09 ENCOUNTER — OFFICE VISIT (OUTPATIENT)
Dept: URBAN - METROPOLITAN AREA CLINIC 78 | Facility: CLINIC | Age: 28
End: 2023-08-09
Payer: COMMERCIAL

## 2023-08-09 VITALS
WEIGHT: 131.4 LBS | SYSTOLIC BLOOD PRESSURE: 119 MMHG | RESPIRATION RATE: 16 BRPM | HEIGHT: 65 IN | HEART RATE: 84 BPM | DIASTOLIC BLOOD PRESSURE: 83 MMHG | BODY MASS INDEX: 21.89 KG/M2 | TEMPERATURE: 98.1 F

## 2023-08-09 DIAGNOSIS — E53.8 VITAMIN B12 DEFICIENCY: ICD-10-CM

## 2023-08-09 DIAGNOSIS — K51.90 ULCERATIVE COLITIS: ICD-10-CM

## 2023-08-09 DIAGNOSIS — E55.9 VITAMIN D DEFICIENCY: ICD-10-CM

## 2023-08-09 PROCEDURE — 99213 OFFICE O/P EST LOW 20 MIN: CPT | Performed by: INTERNAL MEDICINE

## 2023-08-09 RX ORDER — HYDROCORTISONE SODIUM SUCCINATE 100 MG/2ML
AS DIRECTED INJECTION, POWDER, FOR SOLUTION INTRAMUSCULAR; INTRAVENOUS
Qty: 10 | Refills: 3 | Status: ACTIVE | COMMUNITY

## 2023-08-09 RX ORDER — ESTRADIOL 0.1 MG/G
AS DIRECTED CREAM VAGINAL
Status: ACTIVE | COMMUNITY

## 2023-08-09 RX ORDER — INFLIXIMAB 100 MG/10ML
INFUSE 5 MG/KG OVER NO LESS THAN 2 HOUR(S) BY INTRAVENOUS ROUTE INJECTION, POWDER, LYOPHILIZED, FOR SOLUTION INTRAVENOUS
Qty: 10 | Refills: 3 | Status: ACTIVE | COMMUNITY

## 2023-08-09 RX ORDER — INFLIXIMAB 100 MG/10ML
INFUSE 5MG/KG OVER NO LESS THAN 2 HOUR(S) BY INTRAVENOUS ROUTE INJECTION, POWDER, LYOPHILIZED, FOR SOLUTION INTRAVENOUS
Qty: 1 | Refills: 10 | Status: ACTIVE | COMMUNITY

## 2023-08-09 RX ORDER — PREDNISONE 10 MG/1
2 TABLETS TABLET ORAL ONCE A DAY
Qty: 8 TABLET | Refills: 0 | Status: ACTIVE | COMMUNITY

## 2023-08-09 RX ORDER — LIDOCAINE 5% 5 G/100G
AS DIRECTED CREAM TOPICAL
Status: ACTIVE | COMMUNITY

## 2023-08-09 RX ORDER — INFLIXIMAB 100 MG/10ML
RECONSTITUTE WITH NS AND INFUSE 500MG INTRAVENOUSLY OVER AT LEAST 2 HOURS, ONCE EVERY 6 WEEKS. REFRIGERATE INJECTION, POWDER, LYOPHILIZED, FOR SOLUTION INTRAVENOUS
Qty: 4 | Refills: 0 | Status: ACTIVE | COMMUNITY

## 2023-08-09 RX ORDER — NORTRIPTYLINE HYDROCHLORIDE 25 MG/1
AS DIRECTED CAPSULE ORAL ONCE A DAY
Refills: 0 | Status: ACTIVE | COMMUNITY

## 2023-08-09 RX ORDER — OCTISALATE, AVOBENZONE, HOMOSALATE, AND OCTOCRYLENE 29.4; 29.4; 49; 25.48 MG/ML; MG/ML; MG/ML; MG/ML
AS DIRECTED LOTION TOPICAL
Refills: 0 | Status: ACTIVE | COMMUNITY
Start: 1900-01-01

## 2023-08-09 RX ORDER — CETIRIZINE HYDROCHLORIDE 10 MG/1
1 TABLET TABLET, FILM COATED ORAL ONCE A DAY
Qty: 1 TABLET | Status: ACTIVE | COMMUNITY

## 2023-08-09 NOTE — PHYSICAL EXAM GASTROINTESTINAL
Abdomen , soft, Tenderness to palpation in left lower quadrant ,nondistended , no guarding or rigidity , no masses palpable , normal bowel sounds , Liver and Spleen , no hepatomegaly present , liver nontender , spleen not palpable

## 2023-08-09 NOTE — HPI-TODAY'S VISIT:
Patient was doing well She she is presenting for 1 month of symptoms which are primarily pain and discomfort in the left inguinal area and the left side radiating to her left lumbar area.  She is having some constipation which she manages by 800 mg of magnesium.  No rectal bleeding reported.  She has seen her gynecologist who did not see anything clinically significant except for likely cause for vaginal bleeding.  No ovarian cyst noted.  She has a remote history of small renal calculus which was managed conservatively.  Her other complaint is bloating.  Overall she is compliant with Remicade her diet and lifestyle is improved significantly.     PREVIOUS ENCOUNTER: Patient is using suppository  for anal pain /fissure  Patient is taking Magnesium 800-1000 mg for constipation  She has been diagnosed with mild Diabetes Insipidius with plans for starting therapy after her trip  Personal hx of Interstitial cystitis s/p hydrodistention x 6  Dx of vaginal Lichens has beed discontinued , she has been treated with Amytriptilline 20 mg daily  After last Remicade no further UC symptoms  Travelling to [facility]   Labs December 9, 2022: Albumin 4.4 Total testosterone 14 Glucose 94 BUN 9 creatinine 0.54 Sodium 139/potassium 4.8 chloride 106 bicarb 24 calcium 9.6 total protein 6.6 albumin is 4.5 total bili 0.5 alkaline phosphatase 47 AST 4013 ALT 9 vitamin D 61 Total iron 91 TIBC 340 ferritin 25 WBC 5.3 hemoglobin 12.7 hematocrit 37.3 platelets 275 TSH 0.60 and  Patient states that she has seen Derm and  Infectious disease    ID Dr Cid  Hs undergone Pudendal nerve block  Valtrex has been d/c  Interstital cystitis : s/p ihydrodistention  Anal pain /fissure sp evaluation by Dr Holguin : Prn NGT/steroid based suppository  Patient states that she is seeing pelvic floor therapy  She states that she has been recommended pudendal nerve block      PREVIOUS ENCOUNTER:     Patient is seen in follow up  after her Remicade infusion 4/18/2022  ( she was predmedicate with Benedryl and Cortisoone )  Patient is experiencing symptoms : low grade Tmax 100 same day of infusion and cold sore  She took Valtrex on on saturday which helped with cold sore ( Rx by Dr Trinidad)   (-)  Denies swelling of lips or tongue or mouth  (-) breathing problems   On the bright side her ankle pain is resolved  Left lower quadrant tenderness is improved  Denies diarrhea  Does have fissure and is seeing Dr Blu Juarez  Going to PT 2 x week   Patient states that Nortryptilline has made a huge difference in shooting pain /weird pain    Patient got first and second dose of Inflectra ( cold sores )   Jan 5/22 : second dose  Patient states that she had a growth in vaginal area  Jan 20  s/p Biopsy low grade precancerous cells ..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   Remicade denied y [employer] company  Stelara she has adverse effects  Inflectra initiated : Patient appears to have a growth requiring intervention.    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   [facility] : BM q 3-4 /day , urgency  No rectal bleeding    Patient reports pain in left side   Patient has Lialda at home   Reicade in Jan and last in May ( even while )  Patient reports getting Stelara infusion  She tolerated Stelara  infusion well  Patient states that she is fine except  cold sore and drop in sexual drive  She has seen other people report on patient support group  This particular side effect : low libido despite being in clinical remission is not acceptible  She is not on Fluxetine ( she discontinued after 3 days  it because it did not help )  She states that Concerta did not effect he libido in past  She also states that Remicade did not effect it either   Overall she feels well and denies anxiety or depression   Patient states that right after Stelara  she did have hemorrhoid which had since resolved .  [facility] : BM q 1 day No rectal bleeding  No pain  No urgency or tenesmus  She exercises regularly an diet is ok  Weight is ok  Last labs were with PCP in Dec 2020  Vit D  59  Vit B 12 :299    CBC Hg 14.0 Hct 42.9 Platelet 311 CMP Normal      Last colonoscopy 10/20 : Patchy inactive colitis in left side of colon and rectum ( random bx from right and miiddle part of colon reveal histological remission ) Hx of shingles post surgery ( Rhinoplasty )  , resolved  Has mild Raynauds  Has d/c Fluoxetine  Has d/c meds for ADHD  Patient saw Blu PATEL on Dec 20 2019  She has a dx with ADHD and anxiety Denies Etoh use Last Derm exam: sees one regularly .... Last PAP smear last year  ( Zofia Ramirez ) : She is sexually active and admits to using precautions . Currently nt on OCP Summarizing : Dx with pancolitis in 2010 (pt known to me )and then she transferred to [place] 2015 and have relocating back .

## 2023-08-11 ENCOUNTER — OFFICE VISIT (OUTPATIENT)
Dept: URBAN - METROPOLITAN AREA CLINIC 77 | Facility: CLINIC | Age: 28
End: 2023-08-11
Payer: COMMERCIAL

## 2023-08-11 DIAGNOSIS — E53.8 VITAMIN B12 DEFICIENCY: ICD-10-CM

## 2023-08-11 DIAGNOSIS — E56.8 DEFICIENCY OF OTHER VITAMINS: ICD-10-CM

## 2023-08-11 PROCEDURE — 96372 THER/PROPH/DIAG INJ SC/IM: CPT | Performed by: INTERNAL MEDICINE

## 2023-08-14 ENCOUNTER — TELEPHONE ENCOUNTER (OUTPATIENT)
Dept: URBAN - METROPOLITAN AREA CLINIC 78 | Facility: CLINIC | Age: 28
End: 2023-08-14

## 2023-09-08 ENCOUNTER — OFFICE VISIT (OUTPATIENT)
Dept: URBAN - METROPOLITAN AREA CLINIC 77 | Facility: CLINIC | Age: 28
End: 2023-09-08
Payer: COMMERCIAL

## 2023-09-08 DIAGNOSIS — E53.8 VITAMIN B12 DEFICIENCY: ICD-10-CM

## 2023-09-08 PROCEDURE — 96372 THER/PROPH/DIAG INJ SC/IM: CPT | Performed by: INTERNAL MEDICINE

## 2023-09-08 RX ORDER — HYDROCORTISONE SODIUM SUCCINATE 100 MG/2ML
AS DIRECTED INJECTION, POWDER, FOR SOLUTION INTRAMUSCULAR; INTRAVENOUS
Qty: 10 | Refills: 3 | Status: ACTIVE | COMMUNITY

## 2023-09-08 RX ORDER — OCTISALATE, AVOBENZONE, HOMOSALATE, AND OCTOCRYLENE 29.4; 29.4; 49; 25.48 MG/ML; MG/ML; MG/ML; MG/ML
AS DIRECTED LOTION TOPICAL
Refills: 0 | Status: ACTIVE | COMMUNITY
Start: 1900-01-01

## 2023-09-08 RX ORDER — ESTRADIOL 0.1 MG/G
AS DIRECTED CREAM VAGINAL
Status: ACTIVE | COMMUNITY

## 2023-09-08 RX ORDER — CETIRIZINE HYDROCHLORIDE 10 MG/1
1 TABLET TABLET, FILM COATED ORAL ONCE A DAY
Qty: 1 TABLET | Status: ACTIVE | COMMUNITY

## 2023-09-08 RX ORDER — PREDNISONE 10 MG/1
2 TABLETS TABLET ORAL ONCE A DAY
Qty: 8 TABLET | Refills: 0 | Status: ACTIVE | COMMUNITY

## 2023-09-08 RX ORDER — INFLIXIMAB 100 MG/10ML
INFUSE 5MG/KG OVER NO LESS THAN 2 HOUR(S) BY INTRAVENOUS ROUTE INJECTION, POWDER, LYOPHILIZED, FOR SOLUTION INTRAVENOUS
Qty: 1 | Refills: 10 | Status: ACTIVE | COMMUNITY

## 2023-09-08 RX ORDER — INFLIXIMAB 100 MG/10ML
RECONSTITUTE WITH NS AND INFUSE 500MG INTRAVENOUSLY OVER AT LEAST 2 HOURS, ONCE EVERY 6 WEEKS. REFRIGERATE INJECTION, POWDER, LYOPHILIZED, FOR SOLUTION INTRAVENOUS
Qty: 4 | Refills: 0 | Status: ACTIVE | COMMUNITY

## 2023-09-08 RX ORDER — INFLIXIMAB 100 MG/10ML
INFUSE 5 MG/KG OVER NO LESS THAN 2 HOUR(S) BY INTRAVENOUS ROUTE INJECTION, POWDER, LYOPHILIZED, FOR SOLUTION INTRAVENOUS
Qty: 10 | Refills: 3 | Status: ACTIVE | COMMUNITY

## 2023-09-08 RX ORDER — LIDOCAINE 5% 5 G/100G
AS DIRECTED CREAM TOPICAL
Status: ACTIVE | COMMUNITY

## 2023-09-08 RX ORDER — NORTRIPTYLINE HYDROCHLORIDE 25 MG/1
AS DIRECTED CAPSULE ORAL ONCE A DAY
Refills: 0 | Status: ACTIVE | COMMUNITY

## 2023-09-20 ENCOUNTER — OFFICE VISIT (OUTPATIENT)
Dept: URBAN - METROPOLITAN AREA CLINIC 97 | Facility: CLINIC | Age: 28
End: 2023-09-20
Payer: COMMERCIAL

## 2023-09-20 VITALS
TEMPERATURE: 97.9 F | BODY MASS INDEX: 21.63 KG/M2 | SYSTOLIC BLOOD PRESSURE: 107 MMHG | RESPIRATION RATE: 16 BRPM | HEIGHT: 65 IN | WEIGHT: 129.8 LBS | DIASTOLIC BLOOD PRESSURE: 68 MMHG | HEART RATE: 74 BPM

## 2023-09-20 DIAGNOSIS — K51.80 OTHER ULCERATIVE COLITIS WITHOUT COMPLICATIONS: ICD-10-CM

## 2023-09-20 PROCEDURE — 96375 TX/PRO/DX INJ NEW DRUG ADDON: CPT | Performed by: INTERNAL MEDICINE

## 2023-09-20 PROCEDURE — 96415 CHEMO IV INFUSION ADDL HR: CPT | Performed by: INTERNAL MEDICINE

## 2023-09-20 PROCEDURE — 96413 CHEMO IV INFUSION 1 HR: CPT | Performed by: INTERNAL MEDICINE

## 2023-09-20 RX ORDER — CETIRIZINE HYDROCHLORIDE 10 MG/1
1 TABLET TABLET, FILM COATED ORAL ONCE A DAY
Qty: 1 TABLET | Status: ACTIVE | COMMUNITY

## 2023-09-20 RX ORDER — LIDOCAINE 5% 5 G/100G
AS DIRECTED CREAM TOPICAL
Status: ACTIVE | COMMUNITY

## 2023-09-20 RX ORDER — NORTRIPTYLINE HYDROCHLORIDE 25 MG/1
AS DIRECTED CAPSULE ORAL ONCE A DAY
Refills: 0 | Status: ACTIVE | COMMUNITY

## 2023-09-20 RX ORDER — INFLIXIMAB 100 MG/10ML
INFUSE 5 MG/KG OVER NO LESS THAN 2 HOUR(S) BY INTRAVENOUS ROUTE INJECTION, POWDER, LYOPHILIZED, FOR SOLUTION INTRAVENOUS
Qty: 10 | Refills: 3 | Status: ACTIVE | COMMUNITY

## 2023-09-20 RX ORDER — ESTRADIOL 0.1 MG/G
AS DIRECTED CREAM VAGINAL
Status: ACTIVE | COMMUNITY

## 2023-09-20 RX ORDER — INFLIXIMAB 100 MG/10ML
INFUSE 5MG/KG OVER NO LESS THAN 2 HOUR(S) BY INTRAVENOUS ROUTE INJECTION, POWDER, LYOPHILIZED, FOR SOLUTION INTRAVENOUS
Qty: 1 | Refills: 10 | Status: ACTIVE | COMMUNITY

## 2023-09-20 RX ORDER — HYDROCORTISONE SODIUM SUCCINATE 100 MG/2ML
AS DIRECTED INJECTION, POWDER, FOR SOLUTION INTRAMUSCULAR; INTRAVENOUS
Qty: 10 | Refills: 3 | Status: ACTIVE | COMMUNITY

## 2023-09-20 RX ORDER — OCTISALATE, AVOBENZONE, HOMOSALATE, AND OCTOCRYLENE 29.4; 29.4; 49; 25.48 MG/ML; MG/ML; MG/ML; MG/ML
AS DIRECTED LOTION TOPICAL
Refills: 0 | Status: ACTIVE | COMMUNITY
Start: 1900-01-01

## 2023-09-20 RX ORDER — PREDNISONE 10 MG/1
2 TABLETS TABLET ORAL ONCE A DAY
Qty: 8 TABLET | Refills: 0 | Status: ACTIVE | COMMUNITY

## 2023-09-20 RX ORDER — INFLIXIMAB 100 MG/10ML
RECONSTITUTE WITH NS AND INFUSE 500MG INTRAVENOUSLY OVER AT LEAST 2 HOURS, ONCE EVERY 6 WEEKS. REFRIGERATE INJECTION, POWDER, LYOPHILIZED, FOR SOLUTION INTRAVENOUS
Qty: 4 | Refills: 0 | Status: ACTIVE | COMMUNITY

## 2023-10-02 ENCOUNTER — OFFICE VISIT (OUTPATIENT)
Dept: URBAN - METROPOLITAN AREA CLINIC 77 | Facility: CLINIC | Age: 28
End: 2023-10-02

## 2023-10-03 ENCOUNTER — TELEPHONE ENCOUNTER (OUTPATIENT)
Dept: URBAN - METROPOLITAN AREA CLINIC 23 | Facility: CLINIC | Age: 28
End: 2023-10-03

## 2023-10-05 ENCOUNTER — TELEPHONE ENCOUNTER (OUTPATIENT)
Dept: URBAN - METROPOLITAN AREA CLINIC 78 | Facility: CLINIC | Age: 28
End: 2023-10-05

## 2023-10-06 ENCOUNTER — OFFICE VISIT (OUTPATIENT)
Dept: URBAN - METROPOLITAN AREA CLINIC 77 | Facility: CLINIC | Age: 28
End: 2023-10-06

## 2023-11-03 ENCOUNTER — OFFICE VISIT (OUTPATIENT)
Dept: URBAN - METROPOLITAN AREA CLINIC 77 | Facility: CLINIC | Age: 28
End: 2023-11-03
Payer: COMMERCIAL

## 2023-11-03 DIAGNOSIS — E55.9 VITAMIN D DEFICIENCY: ICD-10-CM

## 2023-11-03 DIAGNOSIS — E53.8 VITAMIN B12 DEFICIENCY: ICD-10-CM

## 2023-11-03 PROCEDURE — 96372 THER/PROPH/DIAG INJ SC/IM: CPT | Performed by: INTERNAL MEDICINE

## 2023-11-03 RX ORDER — INFLIXIMAB 100 MG/10ML
RECONSTITUTE WITH NS AND INFUSE 500MG INTRAVENOUSLY OVER AT LEAST 2 HOURS, ONCE EVERY 6 WEEKS. REFRIGERATE INJECTION, POWDER, LYOPHILIZED, FOR SOLUTION INTRAVENOUS
Qty: 4 | Refills: 0 | Status: ACTIVE | COMMUNITY

## 2023-11-03 RX ORDER — INFLIXIMAB 100 MG/10ML
INFUSE 5 MG/KG OVER NO LESS THAN 2 HOUR(S) BY INTRAVENOUS ROUTE INJECTION, POWDER, LYOPHILIZED, FOR SOLUTION INTRAVENOUS
Qty: 10 | Refills: 3 | Status: ACTIVE | COMMUNITY

## 2023-11-03 RX ORDER — INFLIXIMAB 100 MG/10ML
INFUSE 5MG/KG OVER NO LESS THAN 2 HOUR(S) BY INTRAVENOUS ROUTE INJECTION, POWDER, LYOPHILIZED, FOR SOLUTION INTRAVENOUS
Qty: 1 | Refills: 10 | Status: ACTIVE | COMMUNITY

## 2023-11-03 RX ORDER — LIDOCAINE 5% 5 G/100G
AS DIRECTED CREAM TOPICAL
Status: ACTIVE | COMMUNITY

## 2023-11-03 RX ORDER — PREDNISONE 10 MG/1
2 TABLETS TABLET ORAL ONCE A DAY
Qty: 8 TABLET | Refills: 0 | Status: ACTIVE | COMMUNITY

## 2023-11-03 RX ORDER — CETIRIZINE HYDROCHLORIDE 10 MG/1
1 TABLET TABLET, FILM COATED ORAL ONCE A DAY
Qty: 1 TABLET | Status: ACTIVE | COMMUNITY

## 2023-11-03 RX ORDER — OCTISALATE, AVOBENZONE, HOMOSALATE, AND OCTOCRYLENE 29.4; 29.4; 49; 25.48 MG/ML; MG/ML; MG/ML; MG/ML
AS DIRECTED LOTION TOPICAL
Refills: 0 | Status: ACTIVE | COMMUNITY
Start: 1900-01-01

## 2023-11-03 RX ORDER — HYDROCORTISONE SODIUM SUCCINATE 100 MG/2ML
AS DIRECTED INJECTION, POWDER, FOR SOLUTION INTRAMUSCULAR; INTRAVENOUS
Qty: 10 | Refills: 3 | Status: ACTIVE | COMMUNITY

## 2023-11-03 RX ORDER — ESTRADIOL 0.1 MG/G
AS DIRECTED CREAM VAGINAL
Status: ACTIVE | COMMUNITY

## 2023-11-03 RX ORDER — NORTRIPTYLINE HYDROCHLORIDE 25 MG/1
AS DIRECTED CAPSULE ORAL ONCE A DAY
Refills: 0 | Status: ACTIVE | COMMUNITY

## 2023-11-20 ENCOUNTER — OFFICE VISIT (OUTPATIENT)
Dept: URBAN - METROPOLITAN AREA CLINIC 77 | Facility: CLINIC | Age: 28
End: 2023-11-20
Payer: COMMERCIAL

## 2023-11-20 VITALS
DIASTOLIC BLOOD PRESSURE: 84 MMHG | SYSTOLIC BLOOD PRESSURE: 120 MMHG | BODY MASS INDEX: 22.16 KG/M2 | TEMPERATURE: 98.8 F | HEIGHT: 65 IN | WEIGHT: 133 LBS

## 2023-11-20 DIAGNOSIS — K51.80 OTHER ULCERATIVE COLITIS WITHOUT COMPLICATIONS: ICD-10-CM

## 2023-11-20 PROCEDURE — 96375 TX/PRO/DX INJ NEW DRUG ADDON: CPT | Performed by: INTERNAL MEDICINE

## 2023-11-20 PROCEDURE — 96413 CHEMO IV INFUSION 1 HR: CPT | Performed by: INTERNAL MEDICINE

## 2023-11-20 PROCEDURE — 96415 CHEMO IV INFUSION ADDL HR: CPT | Performed by: INTERNAL MEDICINE

## 2023-11-20 RX ORDER — INFLIXIMAB 100 MG/10ML
INFUSE 5 MG/KG OVER NO LESS THAN 2 HOUR(S) BY INTRAVENOUS ROUTE INJECTION, POWDER, LYOPHILIZED, FOR SOLUTION INTRAVENOUS
Qty: 10 | Refills: 3 | Status: ACTIVE | COMMUNITY

## 2023-11-20 RX ORDER — INFLIXIMAB 100 MG/10ML
INFUSE 5MG/KG OVER NO LESS THAN 2 HOUR(S) BY INTRAVENOUS ROUTE INJECTION, POWDER, LYOPHILIZED, FOR SOLUTION INTRAVENOUS
Qty: 1 | Refills: 10 | Status: ACTIVE | COMMUNITY

## 2023-11-20 RX ORDER — NORTRIPTYLINE HYDROCHLORIDE 25 MG/1
AS DIRECTED CAPSULE ORAL ONCE A DAY
Refills: 0 | Status: ACTIVE | COMMUNITY

## 2023-11-20 RX ORDER — INFLIXIMAB 100 MG/10ML
RECONSTITUTE WITH NS AND INFUSE 500MG INTRAVENOUSLY OVER AT LEAST 2 HOURS, ONCE EVERY 6 WEEKS. REFRIGERATE INJECTION, POWDER, LYOPHILIZED, FOR SOLUTION INTRAVENOUS
Qty: 4 | Refills: 0 | Status: ACTIVE | COMMUNITY

## 2023-11-20 RX ORDER — OCTISALATE, AVOBENZONE, HOMOSALATE, AND OCTOCRYLENE 29.4; 29.4; 49; 25.48 MG/ML; MG/ML; MG/ML; MG/ML
AS DIRECTED LOTION TOPICAL
Refills: 0 | Status: ACTIVE | COMMUNITY
Start: 1900-01-01

## 2023-11-20 RX ORDER — CETIRIZINE HYDROCHLORIDE 10 MG/1
1 TABLET TABLET, FILM COATED ORAL ONCE A DAY
Qty: 1 TABLET | Status: ACTIVE | COMMUNITY

## 2023-11-20 RX ORDER — HYDROCORTISONE SODIUM SUCCINATE 100 MG/2ML
AS DIRECTED INJECTION, POWDER, FOR SOLUTION INTRAMUSCULAR; INTRAVENOUS
Qty: 10 | Refills: 3 | Status: ACTIVE | COMMUNITY

## 2023-11-20 RX ORDER — PREDNISONE 10 MG/1
2 TABLETS TABLET ORAL ONCE A DAY
Qty: 8 TABLET | Refills: 0 | Status: ACTIVE | COMMUNITY

## 2023-11-20 RX ORDER — ESTRADIOL 0.1 MG/G
AS DIRECTED CREAM VAGINAL
Status: ACTIVE | COMMUNITY

## 2023-11-20 RX ORDER — LIDOCAINE 5% 5 G/100G
AS DIRECTED CREAM TOPICAL
Status: ACTIVE | COMMUNITY

## 2023-12-01 ENCOUNTER — OFFICE VISIT (OUTPATIENT)
Dept: URBAN - METROPOLITAN AREA CLINIC 77 | Facility: CLINIC | Age: 28
End: 2023-12-01

## 2024-01-18 ENCOUNTER — OFFICE VISIT (OUTPATIENT)
Dept: URBAN - METROPOLITAN AREA CLINIC 53 | Facility: CLINIC | Age: 29
End: 2024-01-18
Payer: COMMERCIAL

## 2024-01-18 ENCOUNTER — DASHBOARD ENCOUNTERS (OUTPATIENT)
Age: 29
End: 2024-01-18

## 2024-01-18 VITALS
BODY MASS INDEX: 23.82 KG/M2 | DIASTOLIC BLOOD PRESSURE: 78 MMHG | TEMPERATURE: 97.7 F | SYSTOLIC BLOOD PRESSURE: 124 MMHG | HEIGHT: 65 IN | WEIGHT: 143 LBS

## 2024-01-18 DIAGNOSIS — K51.80 CHRONIC PANCOLONIC ULCERATIVE COLITIS: ICD-10-CM

## 2024-01-18 PROCEDURE — 96415 CHEMO IV INFUSION ADDL HR: CPT | Performed by: INTERNAL MEDICINE

## 2024-01-18 PROCEDURE — 96413 CHEMO IV INFUSION 1 HR: CPT | Performed by: INTERNAL MEDICINE

## 2024-01-18 PROCEDURE — 96375 TX/PRO/DX INJ NEW DRUG ADDON: CPT | Performed by: INTERNAL MEDICINE

## 2024-01-18 RX ORDER — CETIRIZINE HYDROCHLORIDE 10 MG/1
1 TABLET TABLET, FILM COATED ORAL ONCE A DAY
Qty: 1 TABLET | Status: ACTIVE | COMMUNITY

## 2024-01-18 RX ORDER — NORTRIPTYLINE HYDROCHLORIDE 25 MG/1
AS DIRECTED CAPSULE ORAL ONCE A DAY
Refills: 0 | Status: ACTIVE | COMMUNITY

## 2024-01-18 RX ORDER — OCTISALATE, AVOBENZONE, HOMOSALATE, AND OCTOCRYLENE 29.4; 29.4; 49; 25.48 MG/ML; MG/ML; MG/ML; MG/ML
AS DIRECTED LOTION TOPICAL
Refills: 0 | Status: ACTIVE | COMMUNITY
Start: 1900-01-01

## 2024-01-18 RX ORDER — INFLIXIMAB 100 MG/10ML
RECONSTITUTE WITH NS AND INFUSE 500MG INTRAVENOUSLY OVER AT LEAST 2 HOURS, ONCE EVERY 6 WEEKS. REFRIGERATE INJECTION, POWDER, LYOPHILIZED, FOR SOLUTION INTRAVENOUS
Qty: 4 | Refills: 0 | Status: ACTIVE | COMMUNITY

## 2024-01-18 RX ORDER — INFLIXIMAB 100 MG/10ML
INFUSE 5 MG/KG OVER NO LESS THAN 2 HOUR(S) BY INTRAVENOUS ROUTE INJECTION, POWDER, LYOPHILIZED, FOR SOLUTION INTRAVENOUS
Qty: 10 | Refills: 3 | Status: ACTIVE | COMMUNITY

## 2024-01-18 RX ORDER — ESTRADIOL 0.1 MG/G
AS DIRECTED CREAM VAGINAL
Status: ACTIVE | COMMUNITY

## 2024-01-18 RX ORDER — HYDROCORTISONE SODIUM SUCCINATE 100 MG/2ML
AS DIRECTED INJECTION, POWDER, FOR SOLUTION INTRAMUSCULAR; INTRAVENOUS
Qty: 10 | Refills: 3 | Status: ACTIVE | COMMUNITY

## 2024-01-18 RX ORDER — PREDNISONE 10 MG/1
2 TABLETS TABLET ORAL ONCE A DAY
Qty: 8 TABLET | Refills: 0 | Status: ACTIVE | COMMUNITY

## 2024-01-18 RX ORDER — LIDOCAINE 5% 5 G/100G
AS DIRECTED CREAM TOPICAL
Status: ACTIVE | COMMUNITY

## 2024-01-18 RX ORDER — INFLIXIMAB 100 MG/10ML
INFUSE 5MG/KG OVER NO LESS THAN 2 HOUR(S) BY INTRAVENOUS ROUTE INJECTION, POWDER, LYOPHILIZED, FOR SOLUTION INTRAVENOUS
Qty: 1 | Refills: 10 | Status: ACTIVE | COMMUNITY

## 2024-03-18 ENCOUNTER — REM (OUTPATIENT)
Dept: URBAN - METROPOLITAN AREA CLINIC 77 | Facility: CLINIC | Age: 29
End: 2024-03-18

## 2024-03-22 ENCOUNTER — REM (OUTPATIENT)
Dept: URBAN - METROPOLITAN AREA CLINIC 53 | Facility: CLINIC | Age: 29
End: 2024-03-22
Payer: COMMERCIAL

## 2024-03-22 VITALS
TEMPERATURE: 97.7 F | HEIGHT: 65 IN | BODY MASS INDEX: 24.29 KG/M2 | WEIGHT: 145.8 LBS | SYSTOLIC BLOOD PRESSURE: 121 MMHG | DIASTOLIC BLOOD PRESSURE: 80 MMHG

## 2024-03-22 DIAGNOSIS — K51.80 OTHER ULCERATIVE COLITIS WITHOUT COMPLICATIONS: ICD-10-CM

## 2024-03-22 PROCEDURE — 96413 CHEMO IV INFUSION 1 HR: CPT | Performed by: INTERNAL MEDICINE

## 2024-03-22 PROCEDURE — 96375 TX/PRO/DX INJ NEW DRUG ADDON: CPT | Performed by: INTERNAL MEDICINE

## 2024-03-22 PROCEDURE — 96415 CHEMO IV INFUSION ADDL HR: CPT | Performed by: INTERNAL MEDICINE

## 2024-03-22 RX ORDER — INFLIXIMAB 100 MG/10ML
INFUSE 5MG/KG OVER NO LESS THAN 2 HOUR(S) BY INTRAVENOUS ROUTE INJECTION, POWDER, LYOPHILIZED, FOR SOLUTION INTRAVENOUS
Qty: 1 | Refills: 10 | Status: ACTIVE | COMMUNITY

## 2024-03-22 RX ORDER — INFLIXIMAB 100 MG/10ML
RECONSTITUTE WITH NS AND INFUSE 500MG INTRAVENOUSLY OVER AT LEAST 2 HOURS, ONCE EVERY 6 WEEKS. REFRIGERATE INJECTION, POWDER, LYOPHILIZED, FOR SOLUTION INTRAVENOUS
Qty: 4 | Refills: 0 | Status: ACTIVE | COMMUNITY

## 2024-03-22 RX ORDER — LIDOCAINE 5% 5 G/100G
AS DIRECTED CREAM TOPICAL
Status: ACTIVE | COMMUNITY

## 2024-03-22 RX ORDER — PREDNISONE 10 MG/1
2 TABLETS TABLET ORAL ONCE A DAY
Qty: 8 TABLET | Refills: 0 | Status: ACTIVE | COMMUNITY

## 2024-03-22 RX ORDER — CETIRIZINE HYDROCHLORIDE 10 MG/1
1 TABLET TABLET, FILM COATED ORAL ONCE A DAY
Qty: 1 TABLET | Status: ACTIVE | COMMUNITY

## 2024-03-22 RX ORDER — INFLIXIMAB 100 MG/10ML
RECONSTITUTE WITH NS AND INFUSE 500MG INTRAVENOUSLY OVER AT LEAST 2 HOURS, ONCE EVERY 6 WEEKS. REFRIGERATE INJECTION, POWDER, LYOPHILIZED, FOR SOLUTION INTRAVENOUS
Qty: 10 | Refills: 0 | Status: ACTIVE | COMMUNITY

## 2024-03-22 RX ORDER — HYDROCORTISONE SODIUM SUCCINATE 100 MG/2ML
AS DIRECTED INJECTION, POWDER, FOR SOLUTION INTRAMUSCULAR; INTRAVENOUS
Qty: 10 | Refills: 3 | Status: ACTIVE | COMMUNITY

## 2024-03-22 RX ORDER — ESTRADIOL 0.1 MG/G
AS DIRECTED CREAM VAGINAL
Status: ACTIVE | COMMUNITY

## 2024-03-22 RX ORDER — DIPHENHYDRAMINE HCL 2 %
1 CAPSULE AT BEDTIME AS NEEDED CREAM (GRAM) TOPICAL ONCE A DAY
Qty: 30 | Refills: 0 | Status: ACTIVE | COMMUNITY
Start: 2024-03-05 | End: 2024-04-04

## 2024-03-22 RX ORDER — OCTISALATE, AVOBENZONE, HOMOSALATE, AND OCTOCRYLENE 29.4; 29.4; 49; 25.48 MG/ML; MG/ML; MG/ML; MG/ML
AS DIRECTED LOTION TOPICAL
Refills: 0 | Status: ACTIVE | COMMUNITY
Start: 1900-01-01

## 2024-03-22 RX ORDER — NORTRIPTYLINE HYDROCHLORIDE 25 MG/1
AS DIRECTED CAPSULE ORAL ONCE A DAY
Refills: 0 | Status: ACTIVE | COMMUNITY

## 2024-03-22 RX ORDER — HYDROCORTISONE SODIUM SUCCINATE 100 MG/2ML
AS DIRECTED INJECTION, POWDER, FOR SOLUTION INTRAMUSCULAR; INTRAVENOUS
Qty: 100 MILLIGRAM | Status: ACTIVE | COMMUNITY

## 2024-05-17 ENCOUNTER — OFFICE VISIT (OUTPATIENT)
Dept: URBAN - METROPOLITAN AREA CLINIC 53 | Facility: CLINIC | Age: 29
End: 2024-05-17
Payer: COMMERCIAL

## 2024-05-17 ENCOUNTER — TELEPHONE ENCOUNTER (OUTPATIENT)
Dept: URBAN - METROPOLITAN AREA CLINIC 97 | Facility: CLINIC | Age: 29
End: 2024-05-17

## 2024-05-17 VITALS
WEIGHT: 144.2 LBS | HEIGHT: 65 IN | SYSTOLIC BLOOD PRESSURE: 113 MMHG | TEMPERATURE: 97.9 F | DIASTOLIC BLOOD PRESSURE: 78 MMHG | BODY MASS INDEX: 24.03 KG/M2

## 2024-05-17 DIAGNOSIS — K50.80 CROHN'S COLITIS: ICD-10-CM

## 2024-05-17 PROCEDURE — 96413 CHEMO IV INFUSION 1 HR: CPT | Performed by: INTERNAL MEDICINE

## 2024-05-17 PROCEDURE — 96375 TX/PRO/DX INJ NEW DRUG ADDON: CPT | Performed by: INTERNAL MEDICINE

## 2024-05-17 PROCEDURE — 96415 CHEMO IV INFUSION ADDL HR: CPT | Performed by: INTERNAL MEDICINE

## 2024-05-17 RX ORDER — INFLIXIMAB 100 MG/10ML
INFUSE 5MG/KG OVER NO LESS THAN 2 HOUR(S) BY INTRAVENOUS ROUTE INJECTION, POWDER, LYOPHILIZED, FOR SOLUTION INTRAVENOUS
Qty: 1 | Refills: 10 | Status: ACTIVE | COMMUNITY

## 2024-05-17 RX ORDER — INFLIXIMAB 100 MG/10ML
RECONSTITUTE WITH NS AND INFUSE 500MG INTRAVENOUSLY OVER AT LEAST 2 HOURS, ONCE EVERY 6 WEEKS. REFRIGERATE INJECTION, POWDER, LYOPHILIZED, FOR SOLUTION INTRAVENOUS
Qty: 4 | Refills: 0 | Status: ACTIVE | COMMUNITY

## 2024-05-17 RX ORDER — HYDROCORTISONE SODIUM SUCCINATE 100 MG/2ML
AS DIRECTED INJECTION, POWDER, FOR SOLUTION INTRAMUSCULAR; INTRAVENOUS
Qty: 10 | Refills: 3 | Status: ACTIVE | COMMUNITY

## 2024-05-17 RX ORDER — ESTRADIOL 0.1 MG/G
AS DIRECTED CREAM VAGINAL
Status: ACTIVE | COMMUNITY

## 2024-05-17 RX ORDER — INFLIXIMAB 100 MG/10ML
RECONSTITUTE WITH NS AND INFUSE 500MG INTRAVENOUSLY OVER AT LEAST 2 HOURS, ONCE EVERY 6 WEEKS. REFRIGERATE INJECTION, POWDER, LYOPHILIZED, FOR SOLUTION INTRAVENOUS
Qty: 10 | Refills: 0 | Status: ACTIVE | COMMUNITY

## 2024-05-17 RX ORDER — NORTRIPTYLINE HYDROCHLORIDE 25 MG/1
AS DIRECTED CAPSULE ORAL ONCE A DAY
Refills: 0 | Status: ACTIVE | COMMUNITY

## 2024-05-17 RX ORDER — OCTISALATE, AVOBENZONE, HOMOSALATE, AND OCTOCRYLENE 29.4; 29.4; 49; 25.48 MG/ML; MG/ML; MG/ML; MG/ML
AS DIRECTED LOTION TOPICAL
Refills: 0 | Status: ACTIVE | COMMUNITY
Start: 1900-01-01

## 2024-05-17 RX ORDER — PREDNISONE 10 MG/1
2 TABLETS TABLET ORAL ONCE A DAY
Qty: 8 TABLET | Refills: 0 | Status: ACTIVE | COMMUNITY

## 2024-05-17 RX ORDER — HYDROCORTISONE SODIUM SUCCINATE 100 MG/2ML
AS DIRECTED INJECTION, POWDER, FOR SOLUTION INTRAMUSCULAR; INTRAVENOUS
Qty: 100 MILLIGRAM | Status: ACTIVE | COMMUNITY

## 2024-05-17 RX ORDER — INFLIXIMAB 100 MG/10ML
RECONSTITUTE WITH NS AND INFUSE 500MG INTRAVENOUSLY OVER AT LEAST 2 HOURS, ONCE EVERY 6 WEEKS. REFRIGERATE INJECTION, POWDER, LYOPHILIZED, FOR SOLUTION INTRAVENOUS
Refills: 0
End: 2024-07-16

## 2024-05-17 RX ORDER — LIDOCAINE 5% 5 G/100G
AS DIRECTED CREAM TOPICAL
Status: ACTIVE | COMMUNITY

## 2024-05-17 RX ORDER — CETIRIZINE HYDROCHLORIDE 10 MG/1
1 TABLET TABLET, FILM COATED ORAL ONCE A DAY
Qty: 1 TABLET | Status: ACTIVE | COMMUNITY

## 2024-06-17 ENCOUNTER — OFFICE VISIT (OUTPATIENT)
Dept: URBAN - METROPOLITAN AREA CLINIC 78 | Facility: CLINIC | Age: 29
End: 2024-06-17
Payer: COMMERCIAL

## 2024-06-17 VITALS
WEIGHT: 137 LBS | DIASTOLIC BLOOD PRESSURE: 74 MMHG | HEART RATE: 57 BPM | RESPIRATION RATE: 14 BRPM | SYSTOLIC BLOOD PRESSURE: 107 MMHG | TEMPERATURE: 98.1 F | BODY MASS INDEX: 22.82 KG/M2 | HEIGHT: 65 IN

## 2024-06-17 DIAGNOSIS — K51.019 ULCERATIVE PANCOLITIS WITH COMPLICATION: ICD-10-CM

## 2024-06-17 DIAGNOSIS — K51.90 ULCERATIVE COLITIS: ICD-10-CM

## 2024-06-17 DIAGNOSIS — E55.9 VITAMIN D DEFICIENCY: ICD-10-CM

## 2024-06-17 DIAGNOSIS — K62.89 RECTUM PAIN: ICD-10-CM

## 2024-06-17 PROCEDURE — 99213 OFFICE O/P EST LOW 20 MIN: CPT | Performed by: INTERNAL MEDICINE

## 2024-06-17 PROCEDURE — 46600 DIAGNOSTIC ANOSCOPY SPX: CPT | Performed by: INTERNAL MEDICINE

## 2024-06-17 RX ORDER — PREDNISONE 10 MG/1
2 TABLETS TABLET ORAL ONCE A DAY
Qty: 8 TABLET | Refills: 0 | Status: ON HOLD | COMMUNITY

## 2024-06-17 RX ORDER — ESTRADIOL 0.1 MG/G
AS DIRECTED CREAM VAGINAL
Status: ACTIVE | COMMUNITY

## 2024-06-17 RX ORDER — INFLIXIMAB 100 MG/10ML
INFUSE 5MG/KG OVER NO LESS THAN 2 HOUR(S) BY INTRAVENOUS ROUTE INJECTION, POWDER, LYOPHILIZED, FOR SOLUTION INTRAVENOUS
Qty: 1 | Refills: 10 | Status: ACTIVE | COMMUNITY

## 2024-06-17 RX ORDER — INFLIXIMAB 100 MG/10ML
RECONSTITUTE WITH NS AND INFUSE 500MG INTRAVENOUSLY OVER AT LEAST 2 HOURS, ONCE EVERY 6 WEEKS. REFRIGERATE INJECTION, POWDER, LYOPHILIZED, FOR SOLUTION INTRAVENOUS
Refills: 0

## 2024-06-17 RX ORDER — SODIUM PICOSULFATE, MAGNESIUM OXIDE, AND ANHYDROUS CITRIC ACID 12; 3.5; 1 G/175ML; G/175ML; MG/175ML
175 ML THE FIRST DOSE THE EVENING BEFORE AND SECOND DOSE THE MORNING OF COLONOSCOPY LIQUID ORAL ONCE A DAY
Qty: 350 | OUTPATIENT
Start: 2024-06-17 | End: 2024-06-19

## 2024-06-17 RX ORDER — LIDOCAINE 5% 5 G/100G
AS DIRECTED CREAM TOPICAL
Status: ACTIVE | COMMUNITY

## 2024-06-17 RX ORDER — NORTRIPTYLINE HYDROCHLORIDE 25 MG/1
AS DIRECTED CAPSULE ORAL ONCE A DAY
Refills: 0 | Status: ACTIVE | COMMUNITY

## 2024-06-17 RX ORDER — CETIRIZINE HYDROCHLORIDE 10 MG/1
1 TABLET TABLET, FILM COATED ORAL ONCE A DAY
Qty: 1 TABLET | Status: ACTIVE | COMMUNITY

## 2024-06-17 RX ORDER — HYDROCORTISONE SODIUM SUCCINATE 100 MG/2ML
AS DIRECTED INJECTION, POWDER, FOR SOLUTION INTRAMUSCULAR; INTRAVENOUS
Qty: 100 MILLIGRAM | Status: ON HOLD | COMMUNITY

## 2024-06-17 RX ORDER — INFLIXIMAB 100 MG/10ML
RECONSTITUTE WITH NS AND INFUSE 500MG INTRAVENOUSLY OVER AT LEAST 2 HOURS, ONCE EVERY 6 WEEKS. REFRIGERATE INJECTION, POWDER, LYOPHILIZED, FOR SOLUTION INTRAVENOUS
Refills: 0 | Status: ACTIVE | COMMUNITY
End: 2024-07-16

## 2024-06-17 RX ORDER — OCTISALATE, AVOBENZONE, HOMOSALATE, AND OCTOCRYLENE 29.4; 29.4; 49; 25.48 MG/ML; MG/ML; MG/ML; MG/ML
AS DIRECTED LOTION TOPICAL
Refills: 0 | Status: ACTIVE | COMMUNITY
Start: 1900-01-01

## 2024-06-17 RX ORDER — HYDROCORTISONE SODIUM SUCCINATE 100 MG/2ML
AS DIRECTED INJECTION, POWDER, FOR SOLUTION INTRAMUSCULAR; INTRAVENOUS
Qty: 10 | Refills: 3 | Status: ACTIVE | COMMUNITY

## 2024-06-17 RX ORDER — INFLIXIMAB 100 MG/10ML
RECONSTITUTE WITH NS AND INFUSE 500MG INTRAVENOUSLY OVER AT LEAST 2 HOURS, ONCE EVERY 6 WEEKS. REFRIGERATE INJECTION, POWDER, LYOPHILIZED, FOR SOLUTION INTRAVENOUS
Qty: 4 | Refills: 0 | Status: ACTIVE | COMMUNITY

## 2024-06-17 NOTE — HPI-TODAY'S VISIT:
Patient was doing well. She is good  Last 1 month tenderness in anal region  Working out and eating right  She reports external hemorrhoids ( took Miralax )  Last infusion was MAy 17 2024  Patient reports constipation    PREVIOUS ENCOUNTER: She she is presenting for 1 month of symptoms which are primarily pain and discomfort in the left inguinal area and the left side radiating to her left lumbar area.  She is having some constipation which she manages by 800 mg of magnesium.  No rectal bleeding reported.  She has seen her gynecologist who did not see anything clinically significant except for likely cause for vaginal bleeding.  No ovarian cyst noted.  She has a remote history of small renal calculus which was managed conservatively.  Her other complaint is bloating.  Overall she is compliant with Remicade her diet and lifestyle is improved significantly.     PREVIOUS ENCOUNTER: Patient is using suppository  for anal pain /fissure  Patient is taking Magnesium 800-1000 mg for constipation  She has been diagnosed with mild Diabetes Insipidius with plans for starting therapy after her trip  Personal hx of Interstitial cystitis s/p hydrodistention x 6  Dx of vaginal Lichens has beed discontinued , she has been treated with Amytriptilline 20 mg daily  After last Remicade no further UC symptoms  Travelling to [facility]   Labs December 9, 2022: Albumin 4.4 Total testosterone 14 Glucose 94 BUN 9 creatinine 0.54 Sodium 139/potassium 4.8 chloride 106 bicarb 24 calcium 9.6 total protein 6.6 albumin is 4.5 total bili 0.5 alkaline phosphatase 47 AST 4013 ALT 9 vitamin D 61 Total iron 91 TIBC 340 ferritin 25 WBC 5.3 hemoglobin 12.7 hematocrit 37.3 platelets 275 TSH 0.60 and  Patient states that she has seen Derm and  Infectious disease    ID Dr Cid  Hs undergone Pudendal nerve block  Valtrex has been d/c  Interstital cystitis : s/p ihydrodistention  Anal pain /fissure sp evaluation by Dr Holguin : Prn NGT/steroid based suppository  Patient states that she is seeing pelvic floor therapy  She states that she has been recommended pudendal nerve block      PREVIOUS ENCOUNTER:     Patient is seen in follow up  after her Remicade infusion 4/18/2022  ( she was predmedicate with Benedryl and Cortisoone )  Patient is experiencing symptoms : low grade Tmax 100 same day of infusion and cold sore  She took Valtrex on on saturday which helped with cold sore ( Rx by Dr Trinidad)   (-)  Denies swelling of lips or tongue or mouth  (-) breathing problems   On the bright side her ankle pain is resolved  Left lower quadrant tenderness is improved  Denies diarrhea  Does have fissure and is seeing Dr Blu Juarez  Going to PT 2 x week   Patient states that Nortryptilline has made a huge difference in shooting pain /weird pain    Patient got first and second dose of Inflectra ( cold sores )   Jan 5/22 : second dose  Patient states that she had a growth in vaginal area  Jan 20  s/p Biopsy low grade precancerous cells ..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   Remicade denied y [employer] company  Stelara she has adverse effects  Inflectra initiated : Patient appears to have a growth requiring intervention.    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   [facility] : BM q 3-4 /day , urgency  No rectal bleeding    Patient reports pain in left side   Patient has Lialda at home   Reicade in Jan and last in May ( even while )  Patient reports getting Stelara infusion  She tolerated Stelara  infusion well  Patient states that she is fine except  cold sore and drop in sexual drive  She has seen other people report on patient support group  This particular side effect : low libido despite being in clinical remission is not acceptible  She is not on Fluxetine ( she discontinued after 3 days  it because it did not help )  She states that Concerta did not effect he libido in past  She also states that Remicade did not effect it either   Overall she feels well and denies anxiety or depression   Patient states that right after Stelara  she did have hemorrhoid which had since resolved .  [facility] : BM q 1 day No rectal bleeding  No pain  No urgency or tenesmus  She exercises regularly an diet is ok  Weight is ok  Last labs were with PCP in Dec 2020  Vit D  59  Vit B 12 :299    CBC Hg 14.0 Hct 42.9 Platelet 311 CMP Normal      Last colonoscopy 10/20 : Patchy inactive colitis in left side of colon and rectum ( random bx from right and miiddle part of colon reveal histological remission ) Hx of shingles post surgery ( Rhinoplasty )  , resolved  Has mild Raynauds  Has d/c Fluoxetine  Has d/c meds for ADHD  Patient saw Blu PATEL on Dec 20 2019  She has a dx with ADHD and anxiety Denies Etoh use Last Derm exam: sees one regularly .... Last PAP smear last year  ( Zofia Ramirez ) : She is sexually active and admits to using precautions . Currently nt on OCP Summarizing : Dx with pancolitis in 2010 (pt known to me )and then she transferred to [place] 2015 and have relocating back .

## 2024-06-18 LAB
ALBUMIN: 4.6
ALKALINE PHOSPHATASE: 53
ALT (SGPT): 16
AST (SGOT): 17
BASO (ABSOLUTE): 0
BASOS: 1
BILIRUBIN, TOTAL: 0.4
BUN/CREATININE RATIO: 17
BUN: 10
CALCIUM: 9.2
CARBON DIOXIDE, TOTAL: 23
CHLORIDE: 101
CREATININE: 0.6
EGFR: 123
EOS (ABSOLUTE): 0.2
EOS: 4
FERRITIN, SERUM: 38
GLOBULIN, TOTAL: 2.1
GLUCOSE: 89
HEMATOCRIT: 44.5
HEMATOLOGY COMMENTS:: (no result)
HEMOGLOBIN: 14.3
IMMATURE CELLS: (no result)
IMMATURE GRANS (ABS): 0
IMMATURE GRANULOCYTES: 0
LYMPHS (ABSOLUTE): 2.2
LYMPHS: 40
MCH: 29.1
MCHC: 32.1
MCV: 90
MONOCYTES(ABSOLUTE): 0.4
MONOCYTES: 8
NEUTROPHILS (ABSOLUTE): 2.5
NEUTROPHILS: 47
NRBC: (no result)
PLATELETS: 343
POTASSIUM: 4.6
PROTEIN, TOTAL: 6.7
RBC: 4.92
RDW: 12.5
SODIUM: 137
VITAMIN B12: 430
VITAMIN D, 25-HYDROXY: 21
WBC: 5.4

## 2024-06-21 ENCOUNTER — TELEPHONE ENCOUNTER (OUTPATIENT)
Dept: URBAN - METROPOLITAN AREA CLINIC 78 | Facility: CLINIC | Age: 29
End: 2024-06-21

## 2024-06-21 RX ORDER — INFLIXIMAB 100 MG/10ML
RECONSTITUTE WITH NS AND INFUSE 500MG INTRAVENOUSLY OVER AT LEAST 2 HOURS, ONCE EVERY 6 WEEKS. REFRIGERATE INJECTION, POWDER, LYOPHILIZED, FOR SOLUTION INTRAVENOUS
Refills: 11

## 2024-07-12 ENCOUNTER — OFFICE VISIT (OUTPATIENT)
Dept: URBAN - METROPOLITAN AREA CLINIC 53 | Facility: CLINIC | Age: 29
End: 2024-07-12
Payer: COMMERCIAL

## 2024-07-12 VITALS
TEMPERATURE: 97.7 F | BODY MASS INDEX: 22.82 KG/M2 | SYSTOLIC BLOOD PRESSURE: 105 MMHG | DIASTOLIC BLOOD PRESSURE: 76 MMHG | HEIGHT: 65 IN | WEIGHT: 137 LBS

## 2024-07-12 DIAGNOSIS — K51.80 CHRONIC PANCOLONIC ULCERATIVE COLITIS: ICD-10-CM

## 2024-07-12 PROCEDURE — 96413 CHEMO IV INFUSION 1 HR: CPT | Performed by: INTERNAL MEDICINE

## 2024-07-12 PROCEDURE — 96375 TX/PRO/DX INJ NEW DRUG ADDON: CPT | Performed by: INTERNAL MEDICINE

## 2024-07-12 PROCEDURE — 96415 CHEMO IV INFUSION ADDL HR: CPT | Performed by: INTERNAL MEDICINE

## 2024-07-12 RX ORDER — CETIRIZINE HYDROCHLORIDE 10 MG/1
1 TABLET TABLET, FILM COATED ORAL ONCE A DAY
Qty: 1 TABLET | Status: ACTIVE | COMMUNITY

## 2024-07-12 RX ORDER — OCTISALATE, AVOBENZONE, HOMOSALATE, AND OCTOCRYLENE 29.4; 29.4; 49; 25.48 MG/ML; MG/ML; MG/ML; MG/ML
AS DIRECTED LOTION TOPICAL
Refills: 0 | Status: ACTIVE | COMMUNITY
Start: 1900-01-01

## 2024-07-12 RX ORDER — INFLIXIMAB 100 MG/10ML
INFUSE 5MG/KG OVER NO LESS THAN 2 HOUR(S) BY INTRAVENOUS ROUTE INJECTION, POWDER, LYOPHILIZED, FOR SOLUTION INTRAVENOUS
Qty: 1 | Refills: 10 | Status: ACTIVE | COMMUNITY

## 2024-07-12 RX ORDER — INFLIXIMAB 100 MG/10ML
RECONSTITUTE WITH NS AND INFUSE 500MG INTRAVENOUSLY OVER AT LEAST 2 HOURS, ONCE EVERY 6 WEEKS. REFRIGERATE INJECTION, POWDER, LYOPHILIZED, FOR SOLUTION INTRAVENOUS
Refills: 11 | Status: ACTIVE | COMMUNITY

## 2024-07-12 RX ORDER — ESTRADIOL 0.1 MG/G
AS DIRECTED CREAM VAGINAL
Status: ACTIVE | COMMUNITY

## 2024-07-12 RX ORDER — NORTRIPTYLINE HYDROCHLORIDE 25 MG/1
AS DIRECTED CAPSULE ORAL ONCE A DAY
Refills: 0 | Status: ACTIVE | COMMUNITY

## 2024-07-12 RX ORDER — PREDNISONE 10 MG/1
2 TABLETS TABLET ORAL ONCE A DAY
Qty: 8 TABLET | Refills: 0 | Status: ON HOLD | COMMUNITY

## 2024-07-12 RX ORDER — HYDROCORTISONE SODIUM SUCCINATE 100 MG/2ML
AS DIRECTED INJECTION, POWDER, FOR SOLUTION INTRAMUSCULAR; INTRAVENOUS
Qty: 10 | Refills: 3 | Status: ACTIVE | COMMUNITY

## 2024-07-12 RX ORDER — LIDOCAINE 5% 5 G/100G
AS DIRECTED CREAM TOPICAL
Status: ACTIVE | COMMUNITY

## 2024-07-12 RX ORDER — INFLIXIMAB 100 MG/10ML
RECONSTITUTE WITH NS AND INFUSE 500MG INTRAVENOUSLY OVER AT LEAST 2 HOURS, ONCE EVERY 6 WEEKS. REFRIGERATE INJECTION, POWDER, LYOPHILIZED, FOR SOLUTION INTRAVENOUS
Qty: 4 | Refills: 0 | Status: ACTIVE | COMMUNITY

## 2024-07-12 RX ORDER — HYDROCORTISONE SODIUM SUCCINATE 100 MG/2ML
AS DIRECTED INJECTION, POWDER, FOR SOLUTION INTRAMUSCULAR; INTRAVENOUS
Qty: 100 MILLIGRAM | Status: ON HOLD | COMMUNITY

## 2024-08-20 NOTE — PHYSICAL EXAM GASTROINTESTINAL
Abdomen , soft, nontender, nondistended , no guarding or rigidity , no masses palpable , normal bowel sounds , Liver and Spleen , no hepatomegaly present , liver nontender , spleen not palpable No

## 2024-09-06 ENCOUNTER — OFFICE VISIT (OUTPATIENT)
Dept: URBAN - METROPOLITAN AREA CLINIC 53 | Facility: CLINIC | Age: 29
End: 2024-09-06
Payer: COMMERCIAL

## 2024-09-06 VITALS
DIASTOLIC BLOOD PRESSURE: 72 MMHG | WEIGHT: 135.4 LBS | SYSTOLIC BLOOD PRESSURE: 104 MMHG | BODY MASS INDEX: 22.56 KG/M2 | TEMPERATURE: 97.5 F | HEIGHT: 65 IN

## 2024-09-06 DIAGNOSIS — K51.80 OTHER ULCERATIVE COLITIS WITHOUT COMPLICATIONS: ICD-10-CM

## 2024-09-06 PROCEDURE — 96415 CHEMO IV INFUSION ADDL HR: CPT | Performed by: INTERNAL MEDICINE

## 2024-09-06 PROCEDURE — 96375 TX/PRO/DX INJ NEW DRUG ADDON: CPT | Performed by: INTERNAL MEDICINE

## 2024-09-06 PROCEDURE — 96413 CHEMO IV INFUSION 1 HR: CPT | Performed by: INTERNAL MEDICINE

## 2024-09-06 RX ORDER — INFLIXIMAB 100 MG/10ML
RECONSTITUTE WITH NS AND INFUSE 500MG INTRAVENOUSLY OVER AT LEAST 2 HOURS, ONCE EVERY 6 WEEKS. REFRIGERATE INJECTION, POWDER, LYOPHILIZED, FOR SOLUTION INTRAVENOUS
Refills: 11 | Status: ACTIVE | COMMUNITY

## 2024-09-06 RX ORDER — NORTRIPTYLINE HYDROCHLORIDE 25 MG/1
AS DIRECTED CAPSULE ORAL ONCE A DAY
Refills: 0 | Status: ACTIVE | COMMUNITY

## 2024-09-06 RX ORDER — CETIRIZINE HYDROCHLORIDE 10 MG/1
1 TABLET TABLET, FILM COATED ORAL ONCE A DAY
Qty: 1 TABLET | Status: ACTIVE | COMMUNITY

## 2024-09-06 RX ORDER — PREDNISONE 10 MG/1
2 TABLETS TABLET ORAL ONCE A DAY
Qty: 8 TABLET | Refills: 0 | Status: ON HOLD | COMMUNITY

## 2024-09-06 RX ORDER — LIDOCAINE 5% 5 G/100G
AS DIRECTED CREAM TOPICAL
Status: ACTIVE | COMMUNITY

## 2024-09-06 RX ORDER — HYDROCORTISONE SODIUM SUCCINATE 100 MG/2ML
AS DIRECTED INJECTION, POWDER, FOR SOLUTION INTRAMUSCULAR; INTRAVENOUS
Qty: 100 MILLIGRAM | Status: ON HOLD | COMMUNITY

## 2024-09-06 RX ORDER — HYDROCORTISONE SODIUM SUCCINATE 100 MG/2ML
AS DIRECTED INJECTION, POWDER, FOR SOLUTION INTRAMUSCULAR; INTRAVENOUS
Qty: 10 | Refills: 3 | Status: ACTIVE | COMMUNITY

## 2024-09-06 RX ORDER — INFLIXIMAB 100 MG/10ML
INFUSE 5MG/KG OVER NO LESS THAN 2 HOUR(S) BY INTRAVENOUS ROUTE INJECTION, POWDER, LYOPHILIZED, FOR SOLUTION INTRAVENOUS
Qty: 1 | Refills: 10 | Status: ACTIVE | COMMUNITY

## 2024-09-06 RX ORDER — INFLIXIMAB 100 MG/10ML
RECONSTITUTE WITH NS AND INFUSE 500MG INTRAVENOUSLY OVER AT LEAST 2 HOURS, ONCE EVERY 6 WEEKS. REFRIGERATE INJECTION, POWDER, LYOPHILIZED, FOR SOLUTION INTRAVENOUS
Qty: 4 | Refills: 0 | Status: ACTIVE | COMMUNITY

## 2024-09-06 RX ORDER — ESTRADIOL 0.1 MG/G
AS DIRECTED CREAM VAGINAL
Status: ACTIVE | COMMUNITY

## 2024-09-06 RX ORDER — OCTISALATE, AVOBENZONE, HOMOSALATE, AND OCTOCRYLENE 29.4; 29.4; 49; 25.48 MG/ML; MG/ML; MG/ML; MG/ML
AS DIRECTED LOTION TOPICAL
Refills: 0 | Status: ACTIVE | COMMUNITY
Start: 1900-01-01

## 2024-09-20 ENCOUNTER — OFFICE VISIT (OUTPATIENT)
Dept: URBAN - METROPOLITAN AREA CLINIC 78 | Facility: CLINIC | Age: 29
End: 2024-09-20
Payer: COMMERCIAL

## 2024-09-20 ENCOUNTER — LAB OUTSIDE AN ENCOUNTER (OUTPATIENT)
Dept: URBAN - METROPOLITAN AREA CLINIC 78 | Facility: CLINIC | Age: 29
End: 2024-09-20

## 2024-09-20 VITALS
HEART RATE: 76 BPM | SYSTOLIC BLOOD PRESSURE: 108 MMHG | BODY MASS INDEX: 22.86 KG/M2 | WEIGHT: 137.2 LBS | DIASTOLIC BLOOD PRESSURE: 74 MMHG | TEMPERATURE: 98.1 F | RESPIRATION RATE: 14 BRPM | HEIGHT: 65 IN

## 2024-09-20 DIAGNOSIS — M79.18 MUSCULAR ABDOMINAL PAIN IN LEFT FLANK: ICD-10-CM

## 2024-09-20 DIAGNOSIS — K51.90 ULCERATIVE COLITIS: ICD-10-CM

## 2024-09-20 DIAGNOSIS — E55.9 VITAMIN D DEFICIENCY: ICD-10-CM

## 2024-09-20 DIAGNOSIS — D51.9 VITAMIN B12 DEFICIENCY ANEMIA, UNSPECIFIED: ICD-10-CM

## 2024-09-20 PROCEDURE — 99214 OFFICE O/P EST MOD 30 MIN: CPT | Performed by: INTERNAL MEDICINE

## 2024-09-20 RX ORDER — INFLIXIMAB 100 MG/10ML
RECONSTITUTE WITH NS AND INFUSE 500MG INTRAVENOUSLY OVER AT LEAST 2 HOURS, ONCE EVERY 6 WEEKS. REFRIGERATE INJECTION, POWDER, LYOPHILIZED, FOR SOLUTION INTRAVENOUS
Refills: 11 | Status: ACTIVE | COMMUNITY

## 2024-09-20 RX ORDER — HYDROCORTISONE SODIUM SUCCINATE 100 MG/2ML
AS DIRECTED INJECTION, POWDER, FOR SOLUTION INTRAMUSCULAR; INTRAVENOUS
Qty: 100 MILLIGRAM | Status: ON HOLD | COMMUNITY

## 2024-09-20 RX ORDER — ESTRADIOL 0.1 MG/G
AS DIRECTED CREAM VAGINAL
Status: ACTIVE | COMMUNITY

## 2024-09-20 RX ORDER — PREDNISONE 10 MG/1
2 TABLETS TABLET ORAL ONCE A DAY
Qty: 8 TABLET | Refills: 0 | Status: ON HOLD | COMMUNITY

## 2024-09-20 RX ORDER — INFLIXIMAB 100 MG/10ML
INFUSE 5MG/KG OVER NO LESS THAN 2 HOUR(S) BY INTRAVENOUS ROUTE INJECTION, POWDER, LYOPHILIZED, FOR SOLUTION INTRAVENOUS
Qty: 1 | Refills: 10 | Status: ACTIVE | COMMUNITY

## 2024-09-20 RX ORDER — INFLIXIMAB 100 MG/10ML
RECONSTITUTE WITH NS AND INFUSE 500MG INTRAVENOUSLY OVER AT LEAST 2 HOURS, ONCE EVERY 6 WEEKS. REFRIGERATE INJECTION, POWDER, LYOPHILIZED, FOR SOLUTION INTRAVENOUS
Refills: 11

## 2024-09-20 RX ORDER — CETIRIZINE HYDROCHLORIDE 10 MG/1
1 TABLET TABLET, FILM COATED ORAL ONCE A DAY
Qty: 1 TABLET | Status: ACTIVE | COMMUNITY

## 2024-09-20 RX ORDER — INFLIXIMAB 100 MG/10ML
RECONSTITUTE WITH NS AND INFUSE 500MG INTRAVENOUSLY OVER AT LEAST 2 HOURS, ONCE EVERY 6 WEEKS. REFRIGERATE INJECTION, POWDER, LYOPHILIZED, FOR SOLUTION INTRAVENOUS
Qty: 4 | Refills: 0 | Status: ACTIVE | COMMUNITY

## 2024-09-20 RX ORDER — LIDOCAINE 5% 5 G/100G
AS DIRECTED CREAM TOPICAL
Status: ACTIVE | COMMUNITY

## 2024-09-20 RX ORDER — NORTRIPTYLINE HYDROCHLORIDE 25 MG/1
AS DIRECTED CAPSULE ORAL ONCE A DAY
Refills: 0 | Status: ON HOLD | COMMUNITY

## 2024-09-20 RX ORDER — OCTISALATE, AVOBENZONE, HOMOSALATE, AND OCTOCRYLENE 29.4; 29.4; 49; 25.48 MG/ML; MG/ML; MG/ML; MG/ML
AS DIRECTED LOTION TOPICAL
Refills: 0 | Status: ACTIVE | COMMUNITY
Start: 1900-01-01

## 2024-09-20 RX ORDER — HYDROCORTISONE SODIUM SUCCINATE 100 MG/2ML
AS DIRECTED INJECTION, POWDER, FOR SOLUTION INTRAMUSCULAR; INTRAVENOUS
Qty: 10 | Refills: 3 | Status: ACTIVE | COMMUNITY

## 2024-09-20 NOTE — HPI-TODAY'S VISIT:
Follow up : Left flank/lumbar pain since end of July - Hx of presenting complaint: - Pain starts in L flank, radiates up - Initially thought kidney stones/UTI - Pain persists after UTI treatment - Discomfort w/ eating, comfortable on empty stomach - Aching sensation, feels like "balloon inside" - PMHx: - IBD on Remicade (6-wk regimen) - Herpes zoster (years ago, R side) - Tiny kidney stone (years ago) - Meds: - Remicade q6wks - Amitriptyline - MiraLAX daily   Investigations: - CT abdomen/pelvis w/o contrast (09/12/2024):  Questionable peripancreatic stranding -  Prior colonoscopy 2022 - Prior CT 2023 PREVIOUS ENCOUNTER: She she is presenting for 1 month of symptoms which are primarily pain and discomfort in the left inguinal area and the left side radiating to her left lumbar area.  She is having some constipation which she manages by 800 mg of magnesium.  No rectal bleeding reported.  She has seen her gynecologist who did not see anything clinically significant except for likely cause for vaginal bleeding.  No ovarian cyst noted.  She has a remote history of small renal calculus which was managed conservatively.  Her other complaint is bloating.  Overall she is compliant with Remicade her diet and lifestyle is improved significantly.     PREVIOUS ENCOUNTER: Patient is using suppository  for anal pain /fissure  Patient is taking Magnesium 800-1000 mg for constipation  She has been diagnosed with mild Diabetes Insipidius with plans for starting therapy after her trip  Personal hx of Interstitial cystitis s/p hydrodistention x 6  Dx of vaginal Lichens has beed discontinued , she has been treated with Amytriptilline 20 mg daily  After last Remicade no further UC symptoms  Travelling to [facility]   Labs December 9, 2022: Albumin 4.4 Total testosterone 14 Glucose 94 BUN 9 creatinine 0.54 Sodium 139/potassium 4.8 chloride 106 bicarb 24 calcium 9.6 total protein 6.6 albumin is 4.5 total bili 0.5 alkaline phosphatase 47 AST 4013 ALT 9 vitamin D 61 Total iron 91 TIBC 340 ferritin 25 WBC 5.3 hemoglobin 12.7 hematocrit 37.3 platelets 275 TSH 0.60 and  Patient states that she has seen Derm and  Infectious disease    ID Dr Cid  Hs undergone Pudendal nerve block  Valtrex has been d/c  Interstital cystitis : s/p ihydrodistention  Anal pain /fissure sp evaluation by Dr Holguin : Prn NGT/steroid based suppository  Patient states that she is seeing pelvic floor therapy  She states that she has been recommended pudendal nerve block      PREVIOUS ENCOUNTER:     Patient is seen in follow up  after her Remicade infusion 4/18/2022  ( she was predmedicate with Benedryl and Cortisoone )  Patient is experiencing symptoms : low grade Tmax 100 same day of infusion and cold sore  She took Valtrex on on saturday which helped with cold sore ( Rx by Dr Trinidad)   (-)  Denies swelling of lips or tongue or mouth  (-) breathing problems   On the bright side her ankle pain is resolved  Left lower quadrant tenderness is improved  Denies diarrhea  Does have fissure and is seeing Dr Blu Juarez  Going to PT 2 x week   Patient states that Nortryptilline has made a huge difference in shooting pain /weird pain    Patient got first and second dose of Inflectra ( cold sores )   Jan 5/22 : second dose  Patient states that she had a growth in vaginal area  Jan 20  s/p Biopsy low grade precancerous cells ..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   Remicade denied y Insurance company  Stelara she has adverse effects  Inflectra initiated : Patient appears to have a growth requiring intervention.    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   [facility] : BM q 3-4 /day , urgency  No rectal bleeding    Patient reports pain in left side   Patient has Lialda at home   Reicade in Jan and last in May ( even while )  Patient reports getting Stelara infusion  She tolerated Stelara  infusion well  Patient states that she is fine except  cold sore and drop in sexual drive  She has seen other people report on patient support group  This particular side effect : low libido despite being in clinical remission is not acceptible  She is not on Fluxetine ( she discontinued after 3 days  it because it did not help )  She states that Concerta did not effect he libido in past  She also states that Remicade did not effect it either   Overall she feels well and denies anxiety or depression   Patient states that right after Stelara  she did have hemorrhoid which had since resolved .  [facility] : BM q 1 day No rectal bleeding  No pain  No urgency or tenesmus  She exercises regularly an diet is ok  Weight is ok  Last labs were with PCP in Dec 2020  Vit D  59  Vit B 12 :299    CBC Hg 14.0 Hct 42.9 Platelet 311 CMP Normal      Last colonoscopy 10/20 : Patchy inactive colitis in left side of colon and rectum ( random bx from right and miiddle part of colon reveal histological remission ) Hx of shingles post surgery ( Rhinoplasty )  , resolved  Has mild Raynauds  Has d/c Fluoxetine  Has d/c meds for ADHD  Patient saw Blu Holguin CRS on Dec 20 2019  She has a dx with ADHD and anxiety Denies Etoh use Last Derm exam: sees one regularly .... Last PAP smear last year  ( Zofia Ramirez ) : She is sexually active and admits to using precautions . Currently nt on OCP Summarizing : Dx with pancolitis in 2010 (pt known to me )and then she transferred to [place] 2015 and have relocating back .

## 2024-10-03 ENCOUNTER — CLAIMS CREATED FROM THE CLAIM WINDOW (OUTPATIENT)
Dept: URBAN - METROPOLITAN AREA SURGERY CENTER 15 | Facility: SURGERY CENTER | Age: 29
End: 2024-10-03
Payer: COMMERCIAL

## 2024-10-03 ENCOUNTER — CLAIMS CREATED FROM THE CLAIM WINDOW (OUTPATIENT)
Dept: URBAN - METROPOLITAN AREA CLINIC 4 | Facility: CLINIC | Age: 29
End: 2024-10-03
Payer: COMMERCIAL

## 2024-10-03 DIAGNOSIS — K51.90 ULCERATIVE COLITIS WITHOUT COMPLICATIONS, UNSPECIFIED LOCATION: ICD-10-CM

## 2024-10-03 DIAGNOSIS — K63.89 OTHER SPECIFIED DISEASES OF INTESTINE: ICD-10-CM

## 2024-10-03 DIAGNOSIS — K64.4 PERIANAL SKIN TAGS: ICD-10-CM

## 2024-10-03 DIAGNOSIS — K51.00 ACUTE ULCERATIVE PANCOLITIS: ICD-10-CM

## 2024-10-03 DIAGNOSIS — K51.80 OTHER ULCERATIVE COLITIS WITHOUT COMPLICATIONS: ICD-10-CM

## 2024-10-03 PROCEDURE — 00811 ANES LWR INTST NDSC NOS: CPT | Performed by: NURSE ANESTHETIST, CERTIFIED REGISTERED

## 2024-10-03 PROCEDURE — 45380 COLONOSCOPY AND BIOPSY: CPT | Performed by: INTERNAL MEDICINE

## 2024-10-03 PROCEDURE — 88305 TISSUE EXAM BY PATHOLOGIST: CPT | Performed by: PATHOLOGY

## 2024-10-03 RX ORDER — HYDROCORTISONE SODIUM SUCCINATE 100 MG/2ML
AS DIRECTED INJECTION, POWDER, FOR SOLUTION INTRAMUSCULAR; INTRAVENOUS
Qty: 10 | Refills: 3 | Status: ACTIVE | COMMUNITY

## 2024-10-03 RX ORDER — PREDNISONE 10 MG/1
2 TABLETS TABLET ORAL ONCE A DAY
Qty: 8 TABLET | Refills: 0 | Status: ON HOLD | COMMUNITY

## 2024-10-03 RX ORDER — CETIRIZINE HYDROCHLORIDE 10 MG/1
1 TABLET TABLET, FILM COATED ORAL ONCE A DAY
Qty: 1 TABLET | Status: ACTIVE | COMMUNITY

## 2024-10-03 RX ORDER — INFLIXIMAB 100 MG/10ML
RECONSTITUTE WITH NS AND INFUSE 500MG INTRAVENOUSLY OVER AT LEAST 2 HOURS, ONCE EVERY 6 WEEKS. REFRIGERATE INJECTION, POWDER, LYOPHILIZED, FOR SOLUTION INTRAVENOUS
Qty: 4 | Refills: 0 | Status: ACTIVE | COMMUNITY

## 2024-10-03 RX ORDER — ESTRADIOL 0.1 MG/G
AS DIRECTED CREAM VAGINAL
Status: ACTIVE | COMMUNITY

## 2024-10-03 RX ORDER — LIDOCAINE 5% 5 G/100G
AS DIRECTED CREAM TOPICAL
Status: ACTIVE | COMMUNITY

## 2024-10-03 RX ORDER — HYDROCORTISONE SODIUM SUCCINATE 100 MG/2ML
AS DIRECTED INJECTION, POWDER, FOR SOLUTION INTRAMUSCULAR; INTRAVENOUS
Qty: 100 MILLIGRAM | Status: ON HOLD | COMMUNITY

## 2024-10-03 RX ORDER — OCTISALATE, AVOBENZONE, HOMOSALATE, AND OCTOCRYLENE 29.4; 29.4; 49; 25.48 MG/ML; MG/ML; MG/ML; MG/ML
AS DIRECTED LOTION TOPICAL
Refills: 0 | Status: ACTIVE | COMMUNITY
Start: 1900-01-01

## 2024-10-03 RX ORDER — INFLIXIMAB 100 MG/10ML
INFUSE 5MG/KG OVER NO LESS THAN 2 HOUR(S) BY INTRAVENOUS ROUTE INJECTION, POWDER, LYOPHILIZED, FOR SOLUTION INTRAVENOUS
Qty: 1 | Refills: 10 | Status: ACTIVE | COMMUNITY

## 2024-10-03 RX ORDER — NORTRIPTYLINE HYDROCHLORIDE 25 MG/1
AS DIRECTED CAPSULE ORAL ONCE A DAY
Refills: 0 | Status: ON HOLD | COMMUNITY

## 2024-10-03 RX ORDER — INFLIXIMAB 100 MG/10ML
RECONSTITUTE WITH NS AND INFUSE 500MG INTRAVENOUSLY OVER AT LEAST 2 HOURS, ONCE EVERY 6 WEEKS. REFRIGERATE INJECTION, POWDER, LYOPHILIZED, FOR SOLUTION INTRAVENOUS
Refills: 11 | Status: ACTIVE | COMMUNITY

## 2024-10-08 ENCOUNTER — LAB OUTSIDE AN ENCOUNTER (OUTPATIENT)
Dept: URBAN - METROPOLITAN AREA CLINIC 78 | Facility: CLINIC | Age: 29
End: 2024-10-08

## 2024-10-11 ENCOUNTER — TELEPHONE ENCOUNTER (OUTPATIENT)
Dept: URBAN - METROPOLITAN AREA CLINIC 78 | Facility: CLINIC | Age: 29
End: 2024-10-11

## 2024-10-18 ENCOUNTER — OFFICE VISIT (OUTPATIENT)
Dept: URBAN - METROPOLITAN AREA CLINIC 53 | Facility: CLINIC | Age: 29
End: 2024-10-18

## 2024-10-21 ENCOUNTER — OFFICE VISIT (OUTPATIENT)
Dept: URBAN - METROPOLITAN AREA CLINIC 77 | Facility: CLINIC | Age: 29
End: 2024-10-21

## 2024-10-23 ENCOUNTER — TELEPHONE ENCOUNTER (OUTPATIENT)
Dept: URBAN - METROPOLITAN AREA CLINIC 78 | Facility: CLINIC | Age: 29
End: 2024-10-23

## 2024-10-24 ENCOUNTER — TELEPHONE ENCOUNTER (OUTPATIENT)
Dept: URBAN - METROPOLITAN AREA CLINIC 82 | Facility: CLINIC | Age: 29
End: 2024-10-24

## 2024-10-24 ENCOUNTER — OFFICE VISIT (OUTPATIENT)
Dept: URBAN - METROPOLITAN AREA CLINIC 53 | Facility: CLINIC | Age: 29
End: 2024-10-24

## 2024-10-25 ENCOUNTER — LAB OUTSIDE AN ENCOUNTER (OUTPATIENT)
Dept: URBAN - METROPOLITAN AREA CLINIC 78 | Facility: CLINIC | Age: 29
End: 2024-10-25

## 2024-10-28 ENCOUNTER — OFFICE VISIT (OUTPATIENT)
Dept: URBAN - METROPOLITAN AREA CLINIC 18 | Facility: CLINIC | Age: 29
End: 2024-10-28
Payer: COMMERCIAL

## 2024-10-28 ENCOUNTER — TELEPHONE ENCOUNTER (OUTPATIENT)
Dept: URBAN - METROPOLITAN AREA CLINIC 18 | Facility: CLINIC | Age: 29
End: 2024-10-28

## 2024-10-28 VITALS
SYSTOLIC BLOOD PRESSURE: 121 MMHG | BODY MASS INDEX: 22.66 KG/M2 | WEIGHT: 136 LBS | RESPIRATION RATE: 18 BRPM | HEIGHT: 65 IN | HEART RATE: 78 BPM | TEMPERATURE: 98.2 F | DIASTOLIC BLOOD PRESSURE: 91 MMHG

## 2024-10-28 DIAGNOSIS — K51.90 ULCERATIVE COLITIS: ICD-10-CM

## 2024-10-28 PROCEDURE — 96415 CHEMO IV INFUSION ADDL HR: CPT | Performed by: INTERNAL MEDICINE

## 2024-10-28 PROCEDURE — 96375 TX/PRO/DX INJ NEW DRUG ADDON: CPT | Performed by: INTERNAL MEDICINE

## 2024-10-28 PROCEDURE — 96413 CHEMO IV INFUSION 1 HR: CPT | Performed by: INTERNAL MEDICINE

## 2024-10-28 RX ORDER — OCTISALATE, AVOBENZONE, HOMOSALATE, AND OCTOCRYLENE 29.4; 29.4; 49; 25.48 MG/ML; MG/ML; MG/ML; MG/ML
AS DIRECTED LOTION TOPICAL
Refills: 0 | Status: ACTIVE | COMMUNITY
Start: 1900-01-01

## 2024-10-28 RX ORDER — INFLIXIMAB 100 MG/10ML
RECONSTITUTE WITH NS AND INFUSE 500MG INTRAVENOUSLY OVER AT LEAST 2 HOURS, ONCE EVERY 6 WEEKS. REFRIGERATE INJECTION, POWDER, LYOPHILIZED, FOR SOLUTION INTRAVENOUS
Refills: 11 | Status: ACTIVE | COMMUNITY

## 2024-10-28 RX ORDER — LIDOCAINE 5% 5 G/100G
AS DIRECTED CREAM TOPICAL
Status: ACTIVE | COMMUNITY

## 2024-10-28 RX ORDER — HYDROCORTISONE SODIUM SUCCINATE 100 MG/2ML
AS DIRECTED INJECTION, POWDER, FOR SOLUTION INTRAMUSCULAR; INTRAVENOUS
Qty: 100 MILLIGRAM | Status: ON HOLD | COMMUNITY

## 2024-10-28 RX ORDER — ESTRADIOL 0.1 MG/G
AS DIRECTED CREAM VAGINAL
Status: ACTIVE | COMMUNITY

## 2024-10-28 RX ORDER — INFLIXIMAB 100 MG/10ML
INFUSE 5MG/KG OVER NO LESS THAN 2 HOUR(S) BY INTRAVENOUS ROUTE INJECTION, POWDER, LYOPHILIZED, FOR SOLUTION INTRAVENOUS
Qty: 1 | Refills: 10 | Status: ACTIVE | COMMUNITY

## 2024-10-28 RX ORDER — CETIRIZINE HYDROCHLORIDE 10 MG/1
1 TABLET TABLET, FILM COATED ORAL ONCE A DAY
Qty: 1 TABLET | Status: ACTIVE | COMMUNITY

## 2024-10-28 RX ORDER — INFLIXIMAB 100 MG/10ML
RECONSTITUTE WITH NS AND INFUSE 500MG INTRAVENOUSLY OVER AT LEAST 2 HOURS, ONCE EVERY 6 WEEKS. REFRIGERATE INJECTION, POWDER, LYOPHILIZED, FOR SOLUTION INTRAVENOUS
Qty: 4 | Refills: 0 | Status: ACTIVE | COMMUNITY

## 2024-10-28 RX ORDER — NORTRIPTYLINE HYDROCHLORIDE 25 MG/1
AS DIRECTED CAPSULE ORAL ONCE A DAY
Refills: 0 | Status: ON HOLD | COMMUNITY

## 2024-10-28 RX ORDER — HYDROCORTISONE SODIUM SUCCINATE 100 MG/2ML
AS DIRECTED INJECTION, POWDER, FOR SOLUTION INTRAMUSCULAR; INTRAVENOUS
Qty: 10 | Refills: 3 | Status: ACTIVE | COMMUNITY

## 2024-10-28 RX ORDER — PREDNISONE 10 MG/1
2 TABLETS TABLET ORAL ONCE A DAY
Qty: 8 TABLET | Refills: 0 | Status: ON HOLD | COMMUNITY

## 2024-10-30 ENCOUNTER — LAB OUTSIDE AN ENCOUNTER (OUTPATIENT)
Dept: URBAN - METROPOLITAN AREA CLINIC 78 | Facility: CLINIC | Age: 29
End: 2024-10-30

## 2024-11-01 ENCOUNTER — TELEPHONE ENCOUNTER (OUTPATIENT)
Dept: URBAN - METROPOLITAN AREA CLINIC 78 | Facility: CLINIC | Age: 29
End: 2024-11-01

## 2024-11-01 ENCOUNTER — OFFICE VISIT (OUTPATIENT)
Dept: URBAN - METROPOLITAN AREA CLINIC 53 | Facility: CLINIC | Age: 29
End: 2024-11-01

## 2024-12-06 ENCOUNTER — OFFICE VISIT (OUTPATIENT)
Dept: URBAN - METROPOLITAN AREA CLINIC 53 | Facility: CLINIC | Age: 29
End: 2024-12-06

## 2024-12-09 ENCOUNTER — TELEPHONE ENCOUNTER (OUTPATIENT)
Dept: URBAN - METROPOLITAN AREA CLINIC 18 | Facility: CLINIC | Age: 29
End: 2024-12-09

## 2024-12-09 ENCOUNTER — OFFICE VISIT (OUTPATIENT)
Dept: URBAN - METROPOLITAN AREA CLINIC 18 | Facility: CLINIC | Age: 29
End: 2024-12-09
Payer: COMMERCIAL

## 2024-12-09 VITALS
HEART RATE: 84 BPM | RESPIRATION RATE: 18 BRPM | TEMPERATURE: 97.6 F | HEIGHT: 65 IN | DIASTOLIC BLOOD PRESSURE: 87 MMHG | WEIGHT: 138 LBS | SYSTOLIC BLOOD PRESSURE: 111 MMHG | BODY MASS INDEX: 22.99 KG/M2

## 2024-12-09 DIAGNOSIS — K51.90 ULCERATIVE COLITIS: ICD-10-CM

## 2024-12-09 PROCEDURE — 96413 CHEMO IV INFUSION 1 HR: CPT | Performed by: INTERNAL MEDICINE

## 2024-12-09 PROCEDURE — 96415 CHEMO IV INFUSION ADDL HR: CPT | Performed by: INTERNAL MEDICINE

## 2024-12-09 PROCEDURE — 96375 TX/PRO/DX INJ NEW DRUG ADDON: CPT | Performed by: INTERNAL MEDICINE

## 2024-12-09 RX ORDER — OCTISALATE, AVOBENZONE, HOMOSALATE, AND OCTOCRYLENE 29.4; 29.4; 49; 25.48 MG/ML; MG/ML; MG/ML; MG/ML
AS DIRECTED LOTION TOPICAL
Refills: 0 | Status: ACTIVE | COMMUNITY
Start: 1900-01-01

## 2024-12-09 RX ORDER — HYDROCORTISONE SODIUM SUCCINATE 100 MG/2ML
AS DIRECTED INJECTION, POWDER, FOR SOLUTION INTRAMUSCULAR; INTRAVENOUS
Qty: 10 | Refills: 3 | Status: ACTIVE | COMMUNITY

## 2024-12-09 RX ORDER — NORTRIPTYLINE HYDROCHLORIDE 25 MG/1
AS DIRECTED CAPSULE ORAL ONCE A DAY
Refills: 0 | Status: ON HOLD | COMMUNITY

## 2024-12-09 RX ORDER — ESTRADIOL 0.1 MG/G
AS DIRECTED CREAM VAGINAL
Status: ACTIVE | COMMUNITY

## 2024-12-09 RX ORDER — INFLIXIMAB 100 MG/10ML
RECONSTITUTE WITH NS AND INFUSE 500MG INTRAVENOUSLY OVER AT LEAST 2 HOURS, ONCE EVERY 6 WEEKS. REFRIGERATE INJECTION, POWDER, LYOPHILIZED, FOR SOLUTION INTRAVENOUS
Refills: 11 | Status: ACTIVE | COMMUNITY

## 2024-12-09 RX ORDER — INFLIXIMAB 100 MG/10ML
RECONSTITUTE WITH NS AND INFUSE 500MG INTRAVENOUSLY OVER AT LEAST 2 HOURS, ONCE EVERY 6 WEEKS. REFRIGERATE INJECTION, POWDER, LYOPHILIZED, FOR SOLUTION INTRAVENOUS
Qty: 4 | Refills: 0 | Status: ACTIVE | COMMUNITY

## 2024-12-09 RX ORDER — LIDOCAINE 5% 5 G/100G
AS DIRECTED CREAM TOPICAL
Status: ACTIVE | COMMUNITY

## 2024-12-09 RX ORDER — HYDROCORTISONE SODIUM SUCCINATE 100 MG/2ML
AS DIRECTED INJECTION, POWDER, FOR SOLUTION INTRAMUSCULAR; INTRAVENOUS
Qty: 100 MILLIGRAM | Status: ON HOLD | COMMUNITY

## 2024-12-09 RX ORDER — INFLIXIMAB 100 MG/10ML
INFUSE 5MG/KG OVER NO LESS THAN 2 HOUR(S) BY INTRAVENOUS ROUTE INJECTION, POWDER, LYOPHILIZED, FOR SOLUTION INTRAVENOUS
Qty: 1 | Refills: 10 | Status: ACTIVE | COMMUNITY

## 2024-12-09 RX ORDER — CETIRIZINE HYDROCHLORIDE 10 MG/1
1 TABLET TABLET, FILM COATED ORAL ONCE A DAY
Qty: 1 TABLET | Status: ACTIVE | COMMUNITY

## 2024-12-09 RX ORDER — PREDNISONE 10 MG/1
2 TABLETS TABLET ORAL ONCE A DAY
Qty: 8 TABLET | Refills: 0 | Status: ON HOLD | COMMUNITY

## 2025-01-23 ENCOUNTER — OFFICE VISIT (OUTPATIENT)
Dept: URBAN - METROPOLITAN AREA CLINIC 18 | Facility: CLINIC | Age: 30
End: 2025-01-23
Payer: COMMERCIAL

## 2025-01-23 VITALS
TEMPERATURE: 98.2 F | BODY MASS INDEX: 23.99 KG/M2 | DIASTOLIC BLOOD PRESSURE: 63 MMHG | HEART RATE: 100 BPM | HEIGHT: 65 IN | SYSTOLIC BLOOD PRESSURE: 133 MMHG | WEIGHT: 144 LBS | RESPIRATION RATE: 19 BRPM

## 2025-01-23 DIAGNOSIS — K51.90 ULCERATIVE COLITIS: ICD-10-CM

## 2025-01-23 PROCEDURE — 96375 TX/PRO/DX INJ NEW DRUG ADDON: CPT | Performed by: INTERNAL MEDICINE

## 2025-01-23 PROCEDURE — 96413 CHEMO IV INFUSION 1 HR: CPT | Performed by: INTERNAL MEDICINE

## 2025-01-23 PROCEDURE — 96415 CHEMO IV INFUSION ADDL HR: CPT | Performed by: INTERNAL MEDICINE

## 2025-01-23 RX ORDER — HYDROCORTISONE SODIUM SUCCINATE 100 MG/2ML
AS DIRECTED INJECTION, POWDER, FOR SOLUTION INTRAMUSCULAR; INTRAVENOUS
Qty: 10 | Refills: 3 | Status: ACTIVE | COMMUNITY

## 2025-01-23 RX ORDER — INFLIXIMAB 100 MG/10ML
RECONSTITUTE WITH NS AND INFUSE 500MG INTRAVENOUSLY OVER AT LEAST 2 HOURS, ONCE EVERY 6 WEEKS. REFRIGERATE INJECTION, POWDER, LYOPHILIZED, FOR SOLUTION INTRAVENOUS
Refills: 11 | Status: ACTIVE | COMMUNITY

## 2025-01-23 RX ORDER — ESTRADIOL 0.1 MG/G
AS DIRECTED CREAM VAGINAL
Status: ACTIVE | COMMUNITY

## 2025-01-23 RX ORDER — NORTRIPTYLINE HYDROCHLORIDE 25 MG/1
AS DIRECTED CAPSULE ORAL ONCE A DAY
Refills: 0 | Status: ON HOLD | COMMUNITY

## 2025-01-23 RX ORDER — INFLIXIMAB 100 MG/10ML
RECONSTITUTE WITH NS AND INFUSE 500MG INTRAVENOUSLY OVER AT LEAST 2 HOURS, ONCE EVERY 6 WEEKS. REFRIGERATE INJECTION, POWDER, LYOPHILIZED, FOR SOLUTION INTRAVENOUS
Qty: 4 | Refills: 0 | Status: ACTIVE | COMMUNITY

## 2025-01-23 RX ORDER — OCTISALATE, AVOBENZONE, HOMOSALATE, AND OCTOCRYLENE 29.4; 29.4; 49; 25.48 MG/ML; MG/ML; MG/ML; MG/ML
AS DIRECTED LOTION TOPICAL
Refills: 0 | Status: ACTIVE | COMMUNITY
Start: 1900-01-01

## 2025-01-23 RX ORDER — INFLIXIMAB 100 MG/10ML
INFUSE 5MG/KG OVER NO LESS THAN 2 HOUR(S) BY INTRAVENOUS ROUTE INJECTION, POWDER, LYOPHILIZED, FOR SOLUTION INTRAVENOUS
Qty: 1 | Refills: 10 | Status: ACTIVE | COMMUNITY

## 2025-01-23 RX ORDER — HYDROCORTISONE SODIUM SUCCINATE 100 MG/2ML
AS DIRECTED INJECTION, POWDER, FOR SOLUTION INTRAMUSCULAR; INTRAVENOUS
Qty: 100 MILLIGRAM | Status: ON HOLD | COMMUNITY

## 2025-01-23 RX ORDER — CETIRIZINE HYDROCHLORIDE 10 MG/1
1 TABLET TABLET, FILM COATED ORAL ONCE A DAY
Qty: 1 TABLET | Status: ACTIVE | COMMUNITY

## 2025-01-23 RX ORDER — PREDNISONE 10 MG/1
2 TABLETS TABLET ORAL ONCE A DAY
Qty: 8 TABLET | Refills: 0 | Status: ON HOLD | COMMUNITY

## 2025-01-23 RX ORDER — LIDOCAINE 5% 5 G/100G
AS DIRECTED CREAM TOPICAL
Status: ACTIVE | COMMUNITY

## 2025-03-10 ENCOUNTER — OFFICE VISIT (OUTPATIENT)
Dept: URBAN - METROPOLITAN AREA CLINIC 18 | Facility: CLINIC | Age: 30
End: 2025-03-10
Payer: COMMERCIAL

## 2025-03-10 VITALS
BODY MASS INDEX: 24.16 KG/M2 | DIASTOLIC BLOOD PRESSURE: 62 MMHG | SYSTOLIC BLOOD PRESSURE: 125 MMHG | TEMPERATURE: 97.5 F | RESPIRATION RATE: 20 BRPM | WEIGHT: 145 LBS | HEART RATE: 77 BPM | HEIGHT: 65 IN

## 2025-03-10 DIAGNOSIS — K51.80 OTHER ULCERATIVE COLITIS WITHOUT COMPLICATIONS: ICD-10-CM

## 2025-03-10 PROCEDURE — 96375 TX/PRO/DX INJ NEW DRUG ADDON: CPT | Performed by: INTERNAL MEDICINE

## 2025-03-10 PROCEDURE — 96415 CHEMO IV INFUSION ADDL HR: CPT | Performed by: INTERNAL MEDICINE

## 2025-03-10 PROCEDURE — 96413 CHEMO IV INFUSION 1 HR: CPT | Performed by: INTERNAL MEDICINE

## 2025-03-10 RX ORDER — INFLIXIMAB 100 MG/10ML
RECONSTITUTE WITH NS AND INFUSE 500MG INTRAVENOUSLY OVER AT LEAST 2 HOURS, ONCE EVERY 6 WEEKS. REFRIGERATE INJECTION, POWDER, LYOPHILIZED, FOR SOLUTION INTRAVENOUS
Refills: 11 | Status: ACTIVE | COMMUNITY

## 2025-03-10 RX ORDER — OCTISALATE, AVOBENZONE, HOMOSALATE, AND OCTOCRYLENE 29.4; 29.4; 49; 25.48 MG/ML; MG/ML; MG/ML; MG/ML
AS DIRECTED LOTION TOPICAL
Refills: 0 | Status: ACTIVE | COMMUNITY
Start: 1900-01-01

## 2025-03-10 RX ORDER — HYDROCORTISONE SODIUM SUCCINATE 100 MG/2ML
AS DIRECTED INJECTION, POWDER, FOR SOLUTION INTRAMUSCULAR; INTRAVENOUS
Qty: 10 | Refills: 3 | Status: ACTIVE | COMMUNITY

## 2025-03-10 RX ORDER — PREDNISONE 10 MG/1
2 TABLETS TABLET ORAL ONCE A DAY
Qty: 8 TABLET | Refills: 0 | Status: ON HOLD | COMMUNITY

## 2025-03-10 RX ORDER — NORTRIPTYLINE HYDROCHLORIDE 25 MG/1
AS DIRECTED CAPSULE ORAL ONCE A DAY
Refills: 0 | Status: ON HOLD | COMMUNITY

## 2025-03-10 RX ORDER — INFLIXIMAB 100 MG/10ML
RECONSTITUTE WITH NS AND INFUSE 500MG INTRAVENOUSLY OVER AT LEAST 2 HOURS, ONCE EVERY 6 WEEKS. REFRIGERATE INJECTION, POWDER, LYOPHILIZED, FOR SOLUTION INTRAVENOUS
Qty: 4 | Refills: 0 | Status: ACTIVE | COMMUNITY

## 2025-03-10 RX ORDER — CETIRIZINE HYDROCHLORIDE 10 MG/1
1 TABLET TABLET, FILM COATED ORAL ONCE A DAY
Qty: 1 TABLET | Status: ACTIVE | COMMUNITY

## 2025-03-10 RX ORDER — LIDOCAINE 5% 5 G/100G
AS DIRECTED CREAM TOPICAL
Status: ACTIVE | COMMUNITY

## 2025-03-10 RX ORDER — ESTRADIOL 0.1 MG/G
AS DIRECTED CREAM VAGINAL
Status: ACTIVE | COMMUNITY

## 2025-03-10 RX ORDER — HYDROCORTISONE SODIUM SUCCINATE 100 MG/2ML
AS DIRECTED INJECTION, POWDER, FOR SOLUTION INTRAMUSCULAR; INTRAVENOUS
Qty: 100 MILLIGRAM | Status: ON HOLD | COMMUNITY

## 2025-03-10 RX ORDER — INFLIXIMAB 100 MG/10ML
INFUSE 5MG/KG OVER NO LESS THAN 2 HOUR(S) BY INTRAVENOUS ROUTE INJECTION, POWDER, LYOPHILIZED, FOR SOLUTION INTRAVENOUS
Qty: 1 | Refills: 10 | Status: ACTIVE | COMMUNITY

## 2025-04-14 ENCOUNTER — OFFICE VISIT (OUTPATIENT)
Dept: URBAN - METROPOLITAN AREA CLINIC 77 | Facility: CLINIC | Age: 30
End: 2025-04-14
Payer: COMMERCIAL

## 2025-04-14 ENCOUNTER — TELEPHONE ENCOUNTER (OUTPATIENT)
Dept: URBAN - METROPOLITAN AREA CLINIC 78 | Facility: CLINIC | Age: 30
End: 2025-04-14

## 2025-04-14 DIAGNOSIS — K51.80 OTHER ULCERATIVE COLITIS WITHOUT COMPLICATIONS: ICD-10-CM

## 2025-04-14 PROCEDURE — 96375 TX/PRO/DX INJ NEW DRUG ADDON: CPT | Performed by: INTERNAL MEDICINE

## 2025-04-14 PROCEDURE — 96415 CHEMO IV INFUSION ADDL HR: CPT | Performed by: INTERNAL MEDICINE

## 2025-04-14 PROCEDURE — 96413 CHEMO IV INFUSION 1 HR: CPT | Performed by: INTERNAL MEDICINE

## 2025-04-14 RX ORDER — LIDOCAINE 5% 5 G/100G
AS DIRECTED CREAM TOPICAL
Status: ACTIVE | COMMUNITY

## 2025-04-14 RX ORDER — PREDNISONE 10 MG/1
2 TABLETS TABLET ORAL ONCE A DAY
Qty: 8 TABLET | Refills: 0 | Status: ON HOLD | COMMUNITY

## 2025-04-14 RX ORDER — INFLIXIMAB 100 MG/10ML
RECONSTITUTE WITH NS AND INFUSE 500MG INTRAVENOUSLY OVER AT LEAST 2 HOURS, ONCE EVERY 6 WEEKS. REFRIGERATE INJECTION, POWDER, LYOPHILIZED, FOR SOLUTION INTRAVENOUS
Refills: 11 | Status: ACTIVE | COMMUNITY

## 2025-04-14 RX ORDER — DIPHENHYDRAMINE HYDROCHLORIDE 50 MG/ML
0.5 TO 1 ML AS NEEDED INJECTION INTRAMUSCULAR; INTRAVENOUS DAILY
Qty: 5 MILLILITER | Refills: 5 | OUTPATIENT
Start: 2025-04-14 | End: 2025-04-21

## 2025-04-14 RX ORDER — HYDROCORTISONE SODIUM SUCCINATE 100 MG/2ML
AS DIRECTED INJECTION, POWDER, FOR SOLUTION INTRAMUSCULAR; INTRAVENOUS
Qty: 10 | Refills: 3 | Status: ACTIVE | COMMUNITY

## 2025-04-14 RX ORDER — INFLIXIMAB 100 MG/10ML
RECONSTITUTE WITH NS AND INFUSE 500MG INTRAVENOUSLY OVER AT LEAST 2 HOURS, ONCE EVERY 6 WEEKS. REFRIGERATE INJECTION, POWDER, LYOPHILIZED, FOR SOLUTION INTRAVENOUS
Qty: 4 | Refills: 0 | Status: ACTIVE | COMMUNITY

## 2025-04-14 RX ORDER — INFLIXIMAB 100 MG/10ML
INFUSE 5MG/KG OVER NO LESS THAN 2 HOUR(S) BY INTRAVENOUS ROUTE INJECTION, POWDER, LYOPHILIZED, FOR SOLUTION INTRAVENOUS
Qty: 1 | Refills: 10 | Status: ACTIVE | COMMUNITY

## 2025-04-14 RX ORDER — HYDROCORTISONE SODIUM SUCCINATE 100 MG/2ML
AS DIRECTED INJECTION, POWDER, FOR SOLUTION INTRAMUSCULAR; INTRAVENOUS
Qty: 100 MILLIGRAM | Status: ON HOLD | COMMUNITY

## 2025-04-14 RX ORDER — OCTISALATE, AVOBENZONE, HOMOSALATE, AND OCTOCRYLENE 29.4; 29.4; 49; 25.48 MG/ML; MG/ML; MG/ML; MG/ML
AS DIRECTED LOTION TOPICAL
Refills: 0 | Status: ACTIVE | COMMUNITY
Start: 1900-01-01

## 2025-04-14 RX ORDER — ACETAMINOPHEN 650 MG
2 TABLETS AS NEEDED TABLET, EXTENDED RELEASE ORAL
Qty: 6 | Refills: 0 | OUTPATIENT
Start: 2025-04-14 | End: 2025-04-15

## 2025-04-14 RX ORDER — NORTRIPTYLINE HYDROCHLORIDE 25 MG/1
AS DIRECTED CAPSULE ORAL ONCE A DAY
Refills: 0 | Status: ON HOLD | COMMUNITY

## 2025-04-14 RX ORDER — ESTRADIOL 0.1 MG/G
AS DIRECTED CREAM VAGINAL
Status: ACTIVE | COMMUNITY

## 2025-04-14 RX ORDER — CETIRIZINE HYDROCHLORIDE 10 MG/1
1 TABLET TABLET, FILM COATED ORAL ONCE A DAY
Qty: 1 TABLET | Status: ACTIVE | COMMUNITY

## 2025-04-17 ENCOUNTER — OFFICE VISIT (OUTPATIENT)
Dept: URBAN - METROPOLITAN AREA CLINIC 18 | Facility: CLINIC | Age: 30
End: 2025-04-17

## 2025-04-23 NOTE — PHYSICAL EXAM RECTAL:
CC: Deric is established. Last office visit: 10.23.24 for nasal polyposis and tongue lesion.     Patient returns today for 6 month routine check. Still using budesonide.    Referring Provider:   Demetrius Rivera MD    Medications: medications verified and updated  Added preferred pharmacy  Denies Latex allergy or sensitivity    Patient would like communication of their results via:  [employer]    Cell Phone:   Telephone Information:   Mobile [phone]     Okay to leave a message containing results? Yes   Does patient use [employer]? YES       Skin tags are present. internal hemorrhoids , stool in rectum

## 2025-05-07 ENCOUNTER — TELEPHONE ENCOUNTER (OUTPATIENT)
Dept: URBAN - METROPOLITAN AREA CLINIC 97 | Facility: CLINIC | Age: 30
End: 2025-05-07

## 2025-05-07 RX ORDER — INFLIXIMAB 100 MG/10ML
INFUSE 5MG/KG OVER NO LESS THAN 2 HOUR(S) BY INTRAVENOUS ROUTE INJECTION, POWDER, LYOPHILIZED, FOR SOLUTION INTRAVENOUS
Qty: 10 | Refills: 0

## 2025-05-09 ENCOUNTER — TELEPHONE ENCOUNTER (OUTPATIENT)
Dept: URBAN - METROPOLITAN AREA CLINIC 97 | Facility: CLINIC | Age: 30
End: 2025-05-09

## 2025-05-27 ENCOUNTER — P2P PATIENT RECORD (OUTPATIENT)
Age: 30
End: 2025-05-27

## 2025-05-30 ENCOUNTER — OFFICE VISIT (OUTPATIENT)
Dept: URBAN - METROPOLITAN AREA CLINIC 77 | Facility: CLINIC | Age: 30
End: 2025-05-30

## 2025-05-30 ENCOUNTER — WEB ENCOUNTER (OUTPATIENT)
Dept: URBAN - METROPOLITAN AREA CLINIC 78 | Facility: CLINIC | Age: 30
End: 2025-05-30

## 2025-05-30 RX ORDER — INFLIXIMAB 100 MG/10ML
RECONSTITUTE WITH NS AND INFUSE 500MG INTRAVENOUSLY OVER AT LEAST 2 HOURS, ONCE EVERY 6 WEEKS. REFRIGERATE INJECTION, POWDER, LYOPHILIZED, FOR SOLUTION INTRAVENOUS
Qty: 4 | Refills: 0 | Status: ACTIVE | COMMUNITY

## 2025-05-30 RX ORDER — INFLIXIMAB 100 MG/10ML
RECONSTITUTE WITH NS AND INFUSE 500MG INTRAVENOUSLY OVER AT LEAST 2 HOURS, ONCE EVERY 6 WEEKS. REFRIGERATE INJECTION, POWDER, LYOPHILIZED, FOR SOLUTION INTRAVENOUS
Refills: 11 | Status: ACTIVE | COMMUNITY

## 2025-05-30 RX ORDER — OCTISALATE, AVOBENZONE, HOMOSALATE, AND OCTOCRYLENE 29.4; 29.4; 49; 25.48 MG/ML; MG/ML; MG/ML; MG/ML
AS DIRECTED LOTION TOPICAL
Refills: 0 | Status: ACTIVE | COMMUNITY
Start: 1900-01-01

## 2025-05-30 RX ORDER — NORTRIPTYLINE HYDROCHLORIDE 25 MG/1
AS DIRECTED CAPSULE ORAL ONCE A DAY
Refills: 0 | Status: ON HOLD | COMMUNITY

## 2025-05-30 RX ORDER — LIDOCAINE 5% 5 G/100G
AS DIRECTED CREAM TOPICAL
Status: ACTIVE | COMMUNITY

## 2025-05-30 RX ORDER — ESTRADIOL 0.1 MG/G
AS DIRECTED CREAM VAGINAL
Status: ACTIVE | COMMUNITY

## 2025-05-30 RX ORDER — HYDROCORTISONE SODIUM SUCCINATE 100 MG/2ML
AS DIRECTED INJECTION, POWDER, FOR SOLUTION INTRAMUSCULAR; INTRAVENOUS
Qty: 100 MILLIGRAM | Status: ON HOLD | COMMUNITY

## 2025-05-30 RX ORDER — HYDROCORTISONE SODIUM SUCCINATE 100 MG/2ML
AS DIRECTED INJECTION, POWDER, FOR SOLUTION INTRAMUSCULAR; INTRAVENOUS
Qty: 10 | Refills: 3 | Status: ACTIVE | COMMUNITY

## 2025-05-30 RX ORDER — INFLIXIMAB 100 MG/10ML
INFUSE 5MG/KG OVER NO LESS THAN 2 HOUR(S) BY INTRAVENOUS ROUTE INJECTION, POWDER, LYOPHILIZED, FOR SOLUTION INTRAVENOUS
Qty: 10 | Refills: 0 | Status: ACTIVE | COMMUNITY

## 2025-05-30 RX ORDER — CETIRIZINE HYDROCHLORIDE 10 MG/1
1 TABLET TABLET, FILM COATED ORAL ONCE A DAY
Qty: 1 TABLET | Status: ACTIVE | COMMUNITY

## 2025-05-30 RX ORDER — PREDNISONE 10 MG/1
2 TABLETS TABLET ORAL ONCE A DAY
Qty: 8 TABLET | Refills: 0 | Status: ON HOLD | COMMUNITY

## 2025-07-01 ENCOUNTER — TELEPHONE ENCOUNTER (OUTPATIENT)
Dept: URBAN - METROPOLITAN AREA CLINIC 78 | Facility: CLINIC | Age: 30
End: 2025-07-01

## 2025-07-02 ENCOUNTER — LAB OUTSIDE AN ENCOUNTER (OUTPATIENT)
Dept: URBAN - METROPOLITAN AREA CLINIC 78 | Facility: CLINIC | Age: 30
End: 2025-07-02

## 2025-07-07 LAB
MITOGEN-NIL: 7.69
QUANTIFERON NIL VALUE: 0.05
QUANTIFERON TB1 AG VALUE: 0.04
QUANTIFERON TB2 AG VALUE: 0.03
QUANTIFERON-TB GOLD PLUS: NEGATIVE

## 2025-07-11 ENCOUNTER — OFFICE VISIT (OUTPATIENT)
Dept: URBAN - METROPOLITAN AREA CLINIC 77 | Facility: CLINIC | Age: 30
End: 2025-07-11
Payer: COMMERCIAL

## 2025-07-11 DIAGNOSIS — K51.80 CHRONIC PANCOLONIC ULCERATIVE COLITIS: ICD-10-CM

## 2025-07-11 PROCEDURE — 96375 TX/PRO/DX INJ NEW DRUG ADDON: CPT | Performed by: INTERNAL MEDICINE

## 2025-07-11 PROCEDURE — 96415 CHEMO IV INFUSION ADDL HR: CPT | Performed by: INTERNAL MEDICINE

## 2025-07-11 PROCEDURE — 96413 CHEMO IV INFUSION 1 HR: CPT | Performed by: INTERNAL MEDICINE

## 2025-07-11 RX ORDER — CETIRIZINE HYDROCHLORIDE 10 MG/1
1 TABLET TABLET, FILM COATED ORAL ONCE A DAY
Qty: 1 TABLET | Status: ACTIVE | COMMUNITY

## 2025-07-11 RX ORDER — INFLIXIMAB 100 MG/10ML
RECONSTITUTE WITH NS AND INFUSE 500MG INTRAVENOUSLY OVER AT LEAST 2 HOURS, ONCE EVERY 6 WEEKS. REFRIGERATE INJECTION, POWDER, LYOPHILIZED, FOR SOLUTION INTRAVENOUS
Qty: 4 | Refills: 0 | Status: ACTIVE | COMMUNITY

## 2025-07-11 RX ORDER — INFLIXIMAB 100 MG/10ML
INFUSE 5MG/KG OVER NO LESS THAN 2 HOUR(S) BY INTRAVENOUS ROUTE INJECTION, POWDER, LYOPHILIZED, FOR SOLUTION INTRAVENOUS
Qty: 10 | Refills: 0 | Status: ACTIVE | COMMUNITY

## 2025-07-11 RX ORDER — LIDOCAINE 5% 5 G/100G
AS DIRECTED CREAM TOPICAL
Status: ACTIVE | COMMUNITY

## 2025-07-11 RX ORDER — OCTISALATE, AVOBENZONE, HOMOSALATE, AND OCTOCRYLENE 29.4; 29.4; 49; 25.48 MG/ML; MG/ML; MG/ML; MG/ML
AS DIRECTED LOTION TOPICAL
Refills: 0 | Status: ACTIVE | COMMUNITY
Start: 1900-01-01

## 2025-07-11 RX ORDER — HYDROCORTISONE SODIUM SUCCINATE 100 MG/2ML
AS DIRECTED INJECTION, POWDER, FOR SOLUTION INTRAMUSCULAR; INTRAVENOUS
Qty: 100 MILLIGRAM | Status: ON HOLD | COMMUNITY

## 2025-07-11 RX ORDER — ESTRADIOL 0.1 MG/G
AS DIRECTED CREAM VAGINAL
Status: ACTIVE | COMMUNITY

## 2025-07-11 RX ORDER — PREDNISONE 10 MG/1
2 TABLETS TABLET ORAL ONCE A DAY
Qty: 8 TABLET | Refills: 0 | Status: ON HOLD | COMMUNITY

## 2025-07-11 RX ORDER — HYDROCORTISONE SODIUM SUCCINATE 100 MG/2ML
AS DIRECTED INJECTION, POWDER, FOR SOLUTION INTRAMUSCULAR; INTRAVENOUS
Qty: 10 | Refills: 3 | Status: ACTIVE | COMMUNITY

## 2025-07-11 RX ORDER — NORTRIPTYLINE HYDROCHLORIDE 25 MG/1
AS DIRECTED CAPSULE ORAL ONCE A DAY
Refills: 0 | Status: ON HOLD | COMMUNITY

## 2025-07-11 RX ORDER — INFLIXIMAB 100 MG/10ML
RECONSTITUTE WITH NS AND INFUSE 500MG INTRAVENOUSLY OVER AT LEAST 2 HOURS, ONCE EVERY 6 WEEKS. REFRIGERATE INJECTION, POWDER, LYOPHILIZED, FOR SOLUTION INTRAVENOUS
Refills: 11 | Status: ACTIVE | COMMUNITY

## 2025-08-22 ENCOUNTER — OFFICE VISIT (OUTPATIENT)
Dept: URBAN - METROPOLITAN AREA CLINIC 77 | Facility: CLINIC | Age: 30
End: 2025-08-22
Payer: COMMERCIAL

## 2025-08-22 DIAGNOSIS — K51.90 ULCERATIVE COLITIS: ICD-10-CM

## 2025-08-22 PROCEDURE — 96415 CHEMO IV INFUSION ADDL HR: CPT | Performed by: INTERNAL MEDICINE

## 2025-08-22 PROCEDURE — 96375 TX/PRO/DX INJ NEW DRUG ADDON: CPT | Performed by: INTERNAL MEDICINE

## 2025-08-22 PROCEDURE — 96413 CHEMO IV INFUSION 1 HR: CPT | Performed by: INTERNAL MEDICINE

## 2025-08-22 RX ORDER — HYDROCORTISONE SODIUM SUCCINATE 100 MG/2ML
AS DIRECTED INJECTION, POWDER, FOR SOLUTION INTRAMUSCULAR; INTRAVENOUS
Qty: 100 MILLIGRAM | Status: ON HOLD | COMMUNITY

## 2025-08-22 RX ORDER — NORTRIPTYLINE HYDROCHLORIDE 25 MG/1
AS DIRECTED CAPSULE ORAL ONCE A DAY
Refills: 0 | Status: ON HOLD | COMMUNITY

## 2025-08-22 RX ORDER — INFLIXIMAB 100 MG/10ML
INFUSE 5MG/KG OVER NO LESS THAN 2 HOUR(S) BY INTRAVENOUS ROUTE INJECTION, POWDER, LYOPHILIZED, FOR SOLUTION INTRAVENOUS
Qty: 10 | Refills: 0 | Status: ACTIVE | COMMUNITY

## 2025-08-22 RX ORDER — LIDOCAINE 5% 5 G/100G
AS DIRECTED CREAM TOPICAL
Status: ACTIVE | COMMUNITY

## 2025-08-22 RX ORDER — CETIRIZINE HYDROCHLORIDE 10 MG/1
1 TABLET TABLET, FILM COATED ORAL ONCE A DAY
Qty: 1 TABLET | Status: ACTIVE | COMMUNITY

## 2025-08-22 RX ORDER — INFLIXIMAB 100 MG/10ML
RECONSTITUTE WITH NS AND INFUSE 500MG INTRAVENOUSLY OVER AT LEAST 2 HOURS, ONCE EVERY 6 WEEKS. REFRIGERATE INJECTION, POWDER, LYOPHILIZED, FOR SOLUTION INTRAVENOUS
Refills: 11 | Status: ACTIVE | COMMUNITY

## 2025-08-22 RX ORDER — PREDNISONE 10 MG/1
2 TABLETS TABLET ORAL ONCE A DAY
Qty: 8 TABLET | Refills: 0 | Status: ON HOLD | COMMUNITY

## 2025-08-22 RX ORDER — INFLIXIMAB 100 MG/10ML
RECONSTITUTE WITH NS AND INFUSE 500MG INTRAVENOUSLY OVER AT LEAST 2 HOURS, ONCE EVERY 6 WEEKS. REFRIGERATE INJECTION, POWDER, LYOPHILIZED, FOR SOLUTION INTRAVENOUS
Qty: 4 | Refills: 0 | Status: ACTIVE | COMMUNITY

## 2025-08-22 RX ORDER — ESTRADIOL 0.1 MG/G
AS DIRECTED CREAM VAGINAL
Status: ACTIVE | COMMUNITY

## 2025-08-22 RX ORDER — HYDROCORTISONE SODIUM SUCCINATE 100 MG/2ML
AS DIRECTED INJECTION, POWDER, FOR SOLUTION INTRAMUSCULAR; INTRAVENOUS
Qty: 10 | Refills: 3 | Status: ACTIVE | COMMUNITY

## 2025-08-22 RX ORDER — OCTISALATE, AVOBENZONE, HOMOSALATE, AND OCTOCRYLENE 29.4; 29.4; 49; 25.48 MG/ML; MG/ML; MG/ML; MG/ML
AS DIRECTED LOTION TOPICAL
Refills: 0 | Status: ACTIVE | COMMUNITY
Start: 1900-01-01